# Patient Record
Sex: MALE | Race: WHITE | NOT HISPANIC OR LATINO | Employment: FULL TIME | URBAN - METROPOLITAN AREA
[De-identification: names, ages, dates, MRNs, and addresses within clinical notes are randomized per-mention and may not be internally consistent; named-entity substitution may affect disease eponyms.]

---

## 2017-03-21 ENCOUNTER — TELEPHONE (OUTPATIENT)
Dept: FAMILY MEDICINE | Facility: OTHER | Age: 47
End: 2017-03-21

## 2017-03-21 NOTE — TELEPHONE ENCOUNTER
Reason for call:  Medication  Reason for Call:  Medication or medication refill:    Do you use a [facility]?  Name of the pharmacy and phone number for the current request:  [facility] - [phone]    Name of the medication requested: rx for flying    Other request: is wondering if he could get 6 of them.    Can we leave a detailed message on this number? YES    Phone number patient can be reached at: [phone]    Best Time: any    Call taken on 3/21/2017 at 10:22 AM by Gi Landeros

## 2017-03-21 NOTE — PROGRESS NOTES
SUBJECTIVE:                                                    Pelon Flores is a 46 year old male who presents to clinic today for the following health issues:  Please review meds.  Patient doesn't take the atarax.    Rx for flying.    Pt has flying anxiety.  Diazepam works well for this.  He used to fly twice/week and got used to it, but now flies a lot less, has some issues with this.      Hasn't had a problem with this.    Now flying about 12 times/year.      Problem list and histories reviewed & adjusted, as indicated.  Additional history: as documented    Current Outpatient Prescriptions   Medication Sig Dispense Refill     diazepam (VALIUM) 10 MG tablet Take 1 tablet by mouth daily  Prior to flight 6 tablet 0     Pseudoephedrine-Ibuprofen (ADVIL COLD/SINUS PO)        hydrOXYzine (ATARAX) 25 MG tablet Take 1-2 tablets (25-50 mg) by mouth every 6 hours as needed for anxiety (Patient not taking: Reported on 3/23/2017) 60 tablet 0     omeprazole 20 MG tablet Take 1 tablet by mouth daily. Take 30-60 minutes before a meal. (Patient not taking: Reported on 3/23/2017) 90 tablet 1     Recent Labs   Lab Test  07/23/12   0933  01/28/10   0744  02/01/09   1410   LDL  114  123   --    HDL  34*  38*   --    TRIG  69  131   --    ALT   --    --   23   CR   --    --   0.94   GFRESTIMATED   --    --   90   GFRESTBLACK   --    --   >90   POTASSIUM   --    --   3.4      BP Readings from Last 3 Encounters:   03/23/17 134/88   03/23/15 (!) 132/97   03/27/14 122/68    Wt Readings from Last 3 Encounters:   03/23/17 171 lb 9.6 oz (77.8 kg)   03/27/14 171 lb (77.6 kg)   02/21/13 163 lb (73.9 kg)                    Reviewed and updated as needed this visit by clinical staff  Tobacco  Allergies  Med Hx  Surg Hx  Fam Hx  Soc Hx      Reviewed and updated as needed this visit by Provider         ROS:  Constitutional, HEENT, cardiovascular, pulmonary, gi and gu systems are negative, except as otherwise noted.    OBJECTIVE:        "                                             /88 (BP Location: Left arm, Patient Position: Chair, Cuff Size: Adult Large)  Pulse 84  Temp 97.9  F (36.6  C) (Temporal)  Resp 16  Ht 5' 10\" (1.778 m)  Wt 171 lb 9.6 oz (77.8 kg)  BMI 24.62 kg/m2  Body mass index is 24.62 kg/(m^2).  GENERAL: healthy, alert and no distress  NECK: no adenopathy, no asymmetry, masses, or scars and thyroid normal to palpation  RESP: lungs clear to auscultation - no rales, rhonchi or wheezes  CV: regular rate and rhythm, normal S1 S2, no S3 or S4, no murmur, click or rub, no peripheral edema and peripheral pulses strong  ABDOMEN: soft, nontender, no hepatosplenomegaly, no masses and bowel sounds normal  MS: no gross musculoskeletal defects noted, no edema    Diagnostic Test Results:  none     ASSESSMENT/PLAN:                                                        ICD-10-CM    1. Situational anxiety F41.8 diazepam (VALIUM) 10 MG tablet     Pt has fear of flying, but has done well for years with occasional diazepam use.  Discussed precautions he should take while using this medication.  His use has not been excessive.  No concerns on today's evaluation of patient.  Continue current regimen.  Call/return if any problems or questions arise.               Patient Instructions   Thank you for visiting [facility]    Have a good trip!    I would recommend a physical with labs in the next several months to ensure your blood sugars and cholesterol are OK.    Please make an appointment with your either myself or your provider  in 1 year for follow up.       If you had imaging scheduled please refer to your radiology prep sheet.    Appointment    Date_______________     Time_____________    Day:   M TU W TH F    With____________________________    Location_________________________    If you need medication refills, please contact your pharmacy 3 days before your prescriptions runs out. If you are out of refills, your " pharmacy will contact contact the clinic.    Contact us or return if questions or concerns.     -Your Care Team:  MD Mirela Burgess PA-C Folake Falaki, MD Anoshirvan Mazhari, MD Kelly White, CNP    General information about your clinic      Clinic hours:     Lab hours:  Phone [phone]  Monday 7:30 am-7 pm    Monday 8:30 am-6:30 pm  Tuesday-Friday 7:30 am-5 pm   Tuesday-Friday 8:30 am-4:30 pm    Pharmacy hours:  Phone [phone]  Monday 8:30 am-7pm  Tuesday-Friday 8:30am-6 pm                                       Mychart assistance [phone]        We would like to hear from you, how was your visit today?    Sadie Arriaga  Patient Information Supervisor   Patient Care Supervisor  [facility], and [facility], [facility]  [phone]         Ney Bernstein MD, MD  [facility]

## 2017-03-23 ENCOUNTER — OFFICE VISIT (OUTPATIENT)
Dept: FAMILY MEDICINE | Facility: OTHER | Age: 47
End: 2017-03-23
Payer: COMMERCIAL

## 2017-03-23 VITALS
RESPIRATION RATE: 16 BRPM | HEART RATE: 84 BPM | WEIGHT: 171.6 LBS | SYSTOLIC BLOOD PRESSURE: 134 MMHG | TEMPERATURE: 97.9 F | BODY MASS INDEX: 24.57 KG/M2 | HEIGHT: 70 IN | DIASTOLIC BLOOD PRESSURE: 88 MMHG

## 2017-03-23 DIAGNOSIS — F41.8 SITUATIONAL ANXIETY: ICD-10-CM

## 2017-03-23 PROCEDURE — 99213 OFFICE O/P EST LOW 20 MIN: CPT | Performed by: FAMILY MEDICINE

## 2017-03-23 RX ORDER — DIAZEPAM 10 MG
TABLET ORAL
Qty: 12 TABLET | Refills: 0 | Status: SHIPPED | OUTPATIENT
Start: 2017-03-23 | End: 2018-03-20

## 2017-03-23 ASSESSMENT — PAIN SCALES - GENERAL: PAINLEVEL: NO PAIN (0)

## 2017-03-23 NOTE — PATIENT INSTRUCTIONS
Thank you for visiting [facility]    Have a good trip!    I would recommend a physical with labs in the next several months to ensure your blood sugars and cholesterol are OK.    Please make an appointment with your either myself or your provider  in 1 year for follow up.       If you had imaging scheduled please refer to your radiology prep sheet.    Appointment    Date_______________     Time_____________    Day:   M TU W TH F    With____________________________    Location_________________________    If you need medication refills, please contact your pharmacy 3 days before your prescriptions runs out. If you are out of refills, your pharmacy will contact contact the clinic.    Contact us or return if questions or concerns.     -Your Care Team:  MD Mirela Burgess PA-C Folake Falaki, MD Anoshirvan Mazhari, MD Kelly White, SHAMA    General information about your clinic      Clinic hours:     Lab hours:  Phone [phone]  Monday 7:30 am-7 pm    Monday 8:30 am-6:30 pm  Tuesday-Friday 7:30 am-5 pm   Tuesday-Friday 8:30 am-4:30 pm    Pharmacy hours:  Phone [phone]  Monday 8:30 am-7pm  Tuesday-Friday 8:30am-6 pm                                       Mychart assistance [phone]        We would like to hear from you, how was your visit today?    Sadie Arriaga  Patient Information Supervisor   Patient Care Supervisor  [facility], and [facility], and [facility]  [phone]

## 2017-03-23 NOTE — MR AVS SNAPSHOT
After Visit Summary   3/23/2017    Pelon Flores    MRN: [id number]           Patient Information     Date Of Birth          1970        Visit Information        Provider Department      3/23/2017 9:45 AM Ney Bernstein MD [facility]        Today's Diagnoses     Anxiety          Care Instructions    Thank you for visiting [facility]    Have a good trip!    I would recommend a physical with labs in the next several months to ensure your blood sugars and cholesterol are OK.    Please make an appointment with your either myself or your provider  in 1 year for follow up.       If you had imaging scheduled please refer to your radiology prep sheet.    Appointment    Date_______________     Time_____________    Day:   M TU W TH F    With____________________________    Location_________________________    If you need medication refills, please contact your pharmacy 3 days before your prescriptions runs out. If you are out of refills, your pharmacy will contact contact the clinic.    Contact us or return if questions or concerns.     -Your Care Team:  MD Mirela Burgess PA-C Folake Falaki, MD Anoshirvan Mazhari, MD Kelly White, SHAMA    General information about your clinic      Clinic hours:     Lab hours:  Phone [phone]  Monday 7:30 am-7 pm    Monday 8:30 am-6:30 pm  Tuesday-Friday 7:30 am-5 pm   Tuesday-Friday 8:30 am-4:30 pm    Pharmacy hours:  Phone [phone]  Monday 8:30 am-7pm  Tuesday-Friday 8:30am-6 pm                                       Mychart assistance [phone]        We would like to hear from you, how was your visit today?    Sadie Arriaga  Patient Information Supervisor   Patient Care Supervisor  [facility], and [facility], and [facility]  [phone]           Follow-ups after your visit        Who to contact     If you  "have questions or need follow up information about today's clinic visit or your schedule please contact [facility] directly at [phone].  Normal or non-critical lab and imaging results will be communicated to you by MyChart, letter or phone within 4 business days after the clinic has received the results. If you do not hear from us within 7 days, please contact the clinic through [employer] or phone. If you have a critical or abnormal lab result, we will notify you by phone as soon as possible.  Submit refill requests through [employer] or call your pharmacy and they will forward the refill request to us. Please allow 3 business days for your refill to be completed.          Additional Information About Your Visit        [employer] Information     [employer] lets you send messages to your doctor, view your test results, renew your prescriptions, schedule appointments and more. To sign up, go to [email or web address] . Click on \"Log in\" on the left side of the screen, which will take you to the Welcome page. Then click on \"Sign up Now\" on the right side of the page.     You will be asked to enter the access code listed below, as well as some personal information. Please follow the directions to create your username and password.     Your access code is: [email or web address]  Expires: 2017  9:57 AM     Your access code will  in 90 days. If you need help or a new code, please call your [place] clinic or [phone].        Care EveryWhere ID     This is your Care EveryWhere ID. This could be used by other organizations to access your [facility] medical records  [id number]        Your Vitals Were     Pulse Temperature Respirations Height BMI (Body Mass Index)       84 97.9  F (36.6  C) (Temporal) 16 5' 10\" (1.778 m) 24.62 kg/m2        Blood Pressure from Last 3 Encounters:   17 134/88   03/23/15 (!) 132/97   14 122/68    Weight from Last 3 Encounters:   17 171 lb 9.6 oz (77.8 kg) "   03/27/14 171 lb (77.6 kg)   02/21/13 163 lb (73.9 kg)              Today, you had the following     No orders found for display         Where to get your medicines      Some of these will need a paper prescription and others can be bought over the counter.  Ask your nurse if you have questions.     Bring a paper prescription for each of these medications     diazepam 10 MG tablet          Primary Care Provider Office Phone # Fax #    Linda Aruna Medley [phone]       [facility] [address] [place] [address]        Thank you!     Thank you for choosing [facility]  for your care. Our goal is always to provide you with excellent care. Hearing back from our patients is one way we can continue to improve our services. Please take a few minutes to complete the written survey that you may receive in the mail after your visit with us. Thank you!             Your Updated Medication List - Protect others around you: Learn how to safely use, store and throw away your medicines at www.disposemymeds.org.          This list is accurate as of: 3/23/17  9:57 AM.  Always use your most recent med list.                   Brand Name Dispense Instructions for use    ADVIL COLD/SINUS PO          diazepam 10 MG tablet    VALIUM    12 tablet    Take 1 tablet by mouth daily  Prior to flight       omeprazole 20 MG tablet     90 tablet    Take 1 tablet by mouth daily. Take 30-60 minutes before a meal.

## 2017-03-23 NOTE — NURSING NOTE
"Chief Complaint   Patient presents with     Flying     Panel Management     mychart       Initial /88 (BP Location: Left arm, Patient Position: Chair, Cuff Size: Adult Large)  Pulse 84  Temp 97.9  F (36.6  C) (Temporal)  Resp 16  Ht 5' 10\" (1.778 m)  Wt 171 lb 9.6 oz (77.8 kg)  BMI 24.62 kg/m2 Estimated body mass index is 24.62 kg/(m^2) as calculated from the following:    Height as of this encounter: 5' 10\" (1.778 m).    Weight as of this encounter: 171 lb 9.6 oz (77.8 kg).  Medication Reconciliation: complete  Dagoberto Pimentel CMA    "

## 2017-09-29 ENCOUNTER — OFFICE VISIT (OUTPATIENT)
Dept: URGENT CARE | Facility: RETAIL CLINIC | Age: 47
End: 2017-09-29
Payer: COMMERCIAL

## 2017-09-29 VITALS
HEART RATE: 80 BPM | OXYGEN SATURATION: 99 % | TEMPERATURE: 97.6 F | SYSTOLIC BLOOD PRESSURE: 136 MMHG | DIASTOLIC BLOOD PRESSURE: 94 MMHG

## 2017-09-29 DIAGNOSIS — R06.2 WHEEZING: ICD-10-CM

## 2017-09-29 DIAGNOSIS — J20.9 ACUTE BRONCHITIS WITH COEXISTING CONDITION REQUIRING PROPHYLACTIC TREATMENT: Primary | ICD-10-CM

## 2017-09-29 PROCEDURE — 99213 OFFICE O/P EST LOW 20 MIN: CPT | Performed by: PHYSICIAN ASSISTANT

## 2017-09-29 RX ORDER — ALBUTEROL SULFATE 90 UG/1
1-2 AEROSOL, METERED RESPIRATORY (INHALATION) EVERY 4 HOURS PRN
Qty: 1 INHALER | Refills: 0 | Status: SHIPPED | OUTPATIENT
Start: 2017-09-29 | End: 2018-03-23

## 2017-09-29 RX ORDER — CODEINE PHOSPHATE AND GUAIFENESIN 10; 100 MG/5ML; MG/5ML
1-2 SOLUTION ORAL
Qty: 120 ML | Refills: 0 | Status: SHIPPED | OUTPATIENT
Start: 2017-09-29 | End: 2018-03-23

## 2017-09-29 RX ORDER — AZITHROMYCIN 250 MG/1
TABLET, FILM COATED ORAL
Qty: 6 TABLET | Refills: 0 | Status: SHIPPED | OUTPATIENT
Start: 2017-09-29 | End: 2017-10-03

## 2017-09-29 NOTE — MR AVS SNAPSHOT
"              After Visit Summary   2017    Pelon Flores    MRN: [id number]           Patient Information     Date Of Birth          1970        Visit Information        Provider Department      2017 10:10 AM Alexandra Loera PA-C [facility] [place]        Today's Diagnoses     Acute bronchitis with coexisting condition requiring prophylactic treatment    -  1      Care Instructions    Take antibiotic as directed  Use inhaler as needed  Codeine cough suppressant at bedtime as needed  Can take any cough suppressant during daytime  Can take advil cold and sinus  Fluids, rest, cough drops, humidifier, over the counter pain relievers as needed  Please follow up with primary care provider if not improving, worsening or new symptoms or for any adverse reactions to medications.   May need chest xray if persistent symptoms.           Follow-ups after your visit        Who to contact     You can reach your care team any time of the day by calling [phone].  Notification of test results:  If you have an abnormal lab result, we will notify you by phone as soon as possible.         Additional Information About Your Visit        MyChart Information     [employer] lets you send messages to your doctor, view your test results, renew your prescriptions, schedule appointments and more. To sign up, go to [email or web address] . Click on \"Log in\" on the left side of the screen, which will take you to the Welcome page. Then click on \"Sign up Now\" on the right side of the page.     You will be asked to enter the access code listed below, as well as some personal information. Please follow the directions to create your username and password.     Your access code is: [email or web address]  Expires: 2017 11:05 AM     Your access code will  in 90 days. If you need help or a new code, please call your [place] clinic or [phone].        Care EveryWhere ID     This is your Care EveryWhere ID. " This could be used by other organizations to access your [place] medical records  [id number]        Your Vitals Were     Pulse Temperature Pulse Oximetry             80 97.6  F (36.4  C) (Temporal) 99%          Blood Pressure from Last 3 Encounters:   09/29/17 (!) 136/94   03/23/17 134/88   03/23/15 (!) 132/97    Weight from Last 3 Encounters:   03/23/17 171 lb 9.6 oz (77.8 kg)   03/27/14 171 lb (77.6 kg)   02/21/13 163 lb (73.9 kg)              Today, you had the following     No orders found for display         Today's Medication Changes          These changes are accurate as of: 9/29/17 11:08 AM.  If you have any questions, ask your nurse or doctor.               Start taking these medicines.        Dose/Directions    albuterol 108 (90 BASE) MCG/ACT Inhaler   Commonly known as:  PROAIR HFA/PROVENTIL HFA/VENTOLIN HFA   Used for:  Acute bronchitis with coexisting condition requiring prophylactic treatment        Dose:  1-2 puff   Inhale 1-2 puffs into the lungs every 4 hours as needed For wheezing   Quantity:  1 Inhaler   Refills:  0       azithromycin 250 MG tablet   Commonly known as:  ZITHROMAX   Used for:  Acute bronchitis with coexisting condition requiring prophylactic treatment        Two tablets first day, then one tablet daily for four days.   Quantity:  6 tablet   Refills:  0       guaiFENesin-codeine 100-10 MG/5ML Soln solution   Commonly known as:  ROBITUSSIN AC   Used for:  Acute bronchitis with coexisting condition requiring prophylactic treatment        Dose:  1-2 tsp.   Take 5-10 mLs by mouth nightly as needed for cough   Quantity:  120 mL   Refills:  0            Where to get your medicines      These medications were sent to [facility] - [address]  [address] [place] [address]     Phone:  [phone]     albuterol 108 (90 BASE) MCG/ACT Inhaler    azithromycin 250 MG tablet         Some of these will need a paper prescription and others can  be bought over the counter.  Ask your nurse if you have questions.     Bring a paper prescription for each of these medications     guaiFENesin-codeine 100-10 MG/5ML Soln solution                Primary Care Provider Office Phone # Fax #    Linda Aruna Medley [phone]       XXX RESIGNED XXX [address] [place] [address]        Equal Access to Services     [facility]: Hadii aad ku hadasho Soomaali, waaxda luqadaha, qaybta kaalmada adeegyada, waxay idiin hayaan adeeg kharash la'aan ah. So [facility] [phone].    ATENCIÓN: Si habla espcori, tiene a leahy disposición servicios gratuitos de asistencia lingüística. [facility] [phone].    We comply with applicable federal civil rights laws and [place] laws. We do not discriminate on the basis of race, color, national origin, age, disability sex, sexual orientation or gender identity.            Thank you!     Thank you for choosing [facility]  for your care. Our goal is always to provide you with excellent care. Hearing back from our patients is one way we can continue to improve our services. Please take a few minutes to complete the written survey that you may receive in the mail after your visit with us. Thank you!             Your Updated Medication List - Protect others around you: Learn how to safely use, store and throw away your medicines at www.disposemymeds.org.          This list is accurate as of: 9/29/17 11:08 AM.  Always use your most recent med list.                   Brand Name Dispense Instructions for use Diagnosis    ADVIL COLD/SINUS PO           albuterol 108 (90 BASE) MCG/ACT Inhaler    PROAIR HFA/PROVENTIL HFA/VENTOLIN HFA    1 Inhaler    Inhale 1-2 puffs into the lungs every 4 hours as needed For wheezing    Acute bronchitis with coexisting condition requiring prophylactic treatment       azithromycin 250 MG tablet    ZITHROMAX    6 tablet    Two tablets first day, then one tablet daily for four  days.    Acute bronchitis with coexisting condition requiring prophylactic treatment       diazepam 10 MG tablet    VALIUM    12 tablet    Take 1 tablet by mouth daily  Prior to flight    Situational anxiety       guaiFENesin-codeine 100-10 MG/5ML Soln solution    ROBITUSSIN AC    120 mL    Take 5-10 mLs by mouth nightly as needed for cough    Acute bronchitis with coexisting condition requiring prophylactic treatment       omeprazole 20 MG tablet     90 tablet    Take 1 tablet by mouth daily. Take 30-60 minutes before a meal.    GERD (gastroesophageal reflux disease)       ROBITUSSIN CF PO

## 2017-09-29 NOTE — PATIENT INSTRUCTIONS
Take antibiotic as directed  Use inhaler as needed  Codeine cough suppressant at bedtime as needed  Can take any cough suppressant during daytime  Can take advil cold and sinus  Fluids, rest, cough drops, humidifier, over the counter pain relievers as needed  Please follow up with primary care provider if not improving, worsening or new symptoms or for any adverse reactions to medications.   May need chest xray if persistent symptoms.

## 2017-09-29 NOTE — NURSING NOTE
"Chief Complaint   Patient presents with     Cough     1 week; with wheezing       Initial BP (!) 136/94 (BP Location: Left arm)  Pulse 80  Temp 97.6  F (36.4  C) (Temporal)  SpO2 99% Estimated body mass index is 24.62 kg/(m^2) as calculated from the following:    Height as of 3/23/17: 5' 10\" (1.778 m).    Weight as of 3/23/17: 171 lb 9.6 oz (77.8 kg).  Medication Reconciliation: complete  "

## 2017-09-29 NOTE — PROGRESS NOTES
Chief Complaint   Patient presents with     Cough     1 week; with wheezing     SUBJECTIVE:  Pelon Flores is a 47 year old male who presents to the clinic today with a chief complaint of cough  for 1 week(s).  His cough is described as persistent.    The patient's symptoms are moderate and worsening.  Associated symptoms include congestion. The patient's symptoms are exacerbated by lying down  Patient has been using advil cold and sinus, robitussin to improve symptoms.    Past Medical History:   Diagnosis Date     DEPRESSIVE PSYCHOSIS-MOD 12/2/2007     Inguinal hernia without mention of obstruction or gangrene, bilateral, (not specified as recurrent)      ISOLATED OR SPECIFIC PHOBIAS NEC 2/6/2007     Palpitations 1982    suspected recurring SVT     Current Outpatient Prescriptions   Medication Sig Dispense Refill     Pseudoephedrine-DM-GG (ROBITUSSIN CF PO)        azithromycin (ZITHROMAX) 250 MG tablet Two tablets first day, then one tablet daily for four days. 6 tablet 0     albuterol (PROAIR HFA/PROVENTIL HFA/VENTOLIN HFA) 108 (90 BASE) MCG/ACT Inhaler Inhale 1-2 puffs into the lungs every 4 hours as needed For wheezing 1 Inhaler 0     guaiFENesin-codeine (ROBITUSSIN AC) 100-10 MG/5ML SOLN solution Take 5-10 mLs by mouth nightly as needed for cough 120 mL 0     Pseudoephedrine-Ibuprofen (ADVIL COLD/SINUS PO)        diazepam (VALIUM) 10 MG tablet Take 1 tablet by mouth daily  Prior to flight (Patient not taking: Reported on 9/29/2017) 12 tablet 0     omeprazole 20 MG tablet Take 1 tablet by mouth daily. Take 30-60 minutes before a meal. (Patient not taking: Reported on 3/23/2017) 90 tablet 1        Allergies   Allergen Reactions     Nkda [No Known Drug Allergies]         History   Smoking Status     Never Smoker   Smokeless Tobacco     Never Used     Comment: no smokers in the household       ROS  CONSTITUTIONAL:NEGATIVE for fever, chills  ENT/MOUTH: POSITIVE for nasal congestion and NEGATIVE for sore  throat  RESP:POSITIVE for cough and wheezing    OBJECTIVE:  BP (!) 136/94 (BP Location: Left arm)  Pulse 80  Temp 97.6  F (36.4  C) (Temporal)  SpO2 99%  GENERAL APPEARANCE: healthy, alert and no distress  EYES: conjunctiva clear  HENT: ear canals and TM's normal.  Nose congested mouth without ulcers, erythema or lesions  NECK: supple, nontender, no lymphadenopathy  RESP: rhonchi and few wheezes in upper lobes, no rales  CV: regular rates and rhythm, normal S1 S2, no murmur noted  SKIN: no suspicious lesions or rashes    ASSESSMENT:    (J20.9) Acute bronchitis with coexisting condition requiring prophylactic treatment   (R06.2) Wheezing    PLAN:  azithromycin (ZITHROMAX) 250 MG tablet  albuterol (PROAIR HFA/PROVENTIL HFA/VENTOLIN HFA) 108 (90 BASE) MCG/ACT Inhaler  guaiFENesin-codeine (ROBITUSSIN AC) 100-10 MG/5ML SOLN solution  Take antibiotic as directed  Use inhaler as needed  Codeine cough suppressant at bedtime as needed  Can take any cough suppressant during daytime  Can take advil cold and sinus  Fluids, rest, cough drops, humidifier, over the counter pain relievers as needed  Please follow up with primary care provider if not improving, worsening or new symptoms or for any adverse reactions to medications.   May need chest xray if persistent symptoms.     Alexandra Loera PA-C  [facility]

## 2018-03-05 ENCOUNTER — ALLIED HEALTH/NURSE VISIT (OUTPATIENT)
Dept: FAMILY MEDICINE | Facility: OTHER | Age: 48
End: 2018-03-05
Payer: COMMERCIAL

## 2018-03-05 ENCOUNTER — TELEPHONE (OUTPATIENT)
Dept: FAMILY MEDICINE | Facility: OTHER | Age: 48
End: 2018-03-05

## 2018-03-05 DIAGNOSIS — Z20.818 PERTUSSIS EXPOSURE: Primary | ICD-10-CM

## 2018-03-05 DIAGNOSIS — Z23 NEED FOR VACCINATION: Primary | ICD-10-CM

## 2018-03-05 PROCEDURE — 99207 ZZC NO CHARGE LOS: CPT

## 2018-03-05 PROCEDURE — 90471 IMMUNIZATION ADMIN: CPT

## 2018-03-05 PROCEDURE — 90715 TDAP VACCINE 7 YRS/> IM: CPT

## 2018-03-05 RX ORDER — AZITHROMYCIN 250 MG/1
TABLET, FILM COATED ORAL
Qty: 6 TABLET | Refills: 0 | Status: SHIPPED | OUTPATIENT
Start: 2018-03-05 | End: 2018-03-23

## 2018-03-05 NOTE — TELEPHONE ENCOUNTER
Child positive for pertussis.  Per provider ok to treat.    Wife informed.  Last tdap 01/28/2018  Recommend Tdap early per provider    Manuel Lewis, RN, BSN

## 2018-03-05 NOTE — PROGRESS NOTES
Screening Questionnaire for Adult Immunization    Are you sick today?   No   Do you have allergies to medications, food, a vaccine component or latex?   No   Have you ever had a serious reaction after receiving a vaccination?   No   Do you have a long-term health problem with heart disease, lung disease, asthma, kidney disease, metabolic disease (e.g. diabetes), anemia, or other blood disorder?   No   Do you have cancer, leukemia, HIV/AIDS, or any other immune system problem?   No   In the past 3 months, have you taken medications that affect  your immune system, such as prednisone, other steroids, or anticancer drugs; drugs for the treatment of rheumatoid arthritis, Crohn s disease, or psoriasis; or have you had radiation treatments?   No   Have you had a seizure, or a brain or other nervous system problem?   No   During the past year, have you received a transfusion of blood or blood     products, or been given immune (gamma) globulin or antiviral drug?   No   For women: Are you pregnant or is there a chance you could become        pregnant during the next month?   No   Have you received any vaccinations in the past 4 weeks?   No     Immunization questionnaire answers were all negative.        Per orders of Maximus Manzano, injection of TDAP given by Myrtle Royal. Patient instructed to remain in clinic for 15 minutes afterwards, and to report any adverse reaction to me immediately.       Screening performed by Myrtle Royal on 3/5/2018 at 12:30 PM.

## 2018-03-05 NOTE — MR AVS SNAPSHOT
"              After Visit Summary   3/5/2018    Pelon Flores    MRN: [id number]           Patient Information     Date Of Birth          1970        Visit Information        Provider Department      3/5/2018 1:30 PM NL FLOAT NURSE [facility]        Today's Diagnoses     Need for vaccination    -  1       Follow-ups after your visit        Your next 10 appointments already scheduled     Mar 05, 2018  1:30 PM CST   Nurse Only with NL FLOAT NURSE [facility] ([facility])    [address] [address] [phone]              Who to contact     If you have questions or need follow up information about today's clinic visit or your schedule please contact [facility] directly at [phone].  Normal or non-critical lab and imaging results will be communicated to you by MyChart, letter or phone within 4 business days after the clinic has received the results. If you do not hear from us within 7 days, please contact the clinic through MyChart or phone. If you have a critical or abnormal lab result, we will notify you by phone as soon as possible.  Submit refill requests through QuickBl[employer] or call your pharmacy and they will forward the refill request to us. Please allow 3 business days for your refill to be completed.          Additional Information About Your Visit        MyChart Information     QuickBlox lets you send messages to your doctor, view your test results, renew your prescriptions, schedule appointments and more. To sign up, go to [email or web address] . Click on \"Log in\" on the left side of the screen, which will take you to the Welcome page. Then click on \"Sign up Now\" on the right side of the page.     You will be asked to enter the access code listed below, as well as some personal information. Please follow the directions to create your username and password.     Your access code is: [email or web address]  Expires: " 6/3/2018 12:31 PM     Your access code will  in 90 days. If you need help or a new code, please call your [place] clinic or [phone].        Care EveryWhere ID     This is your Care EveryWhere ID. This could be used by other organizations to access your [place] medical records  [id number]         Blood Pressure from Last 3 Encounters:   17 (!) 136/94   17 134/88   03/23/15 (!) 132/97    Weight from Last 3 Encounters:   17 171 lb 9.6 oz (77.8 kg)   14 171 lb (77.6 kg)   13 163 lb (73.9 kg)              We Performed the Following     TDAP VACCINE (ADACEL) [17156.002]          Today's Medication Changes          These changes are accurate as of 3/5/18 12:31 PM.  If you have any questions, ask your nurse or doctor.               Start taking these medicines.        Dose/Directions    azithromycin 250 MG tablet   Commonly known as:  ZITHROMAX   Used for:  Pertussis exposure   Started by:  Maximus Paulson PA-C        Two tablets first day, then one tablet daily for four days.   Quantity:  6 tablet   Refills:  0            Where to get your medicines      These medications were sent to [facility] - [id number]   [address] [place] [address]     Phone:  [phone]     azithromycin 250 MG tablet                Primary Care Provider Fax #    Physician No Ref-Primary [phone]       No address on file        Equal Access to Services     REED [facility] CenterINOCENCIA : Hadii alex olivia hadasho Sojarvisali, waaxda luqadaha, qaybta kaalmada adeegyada, waxay vilma hodgson. So [facility] [phone].    ATENCIÓN: Si habla español, tiene a leahy disposición servicios gratuitos de asistencia lingüística. Llame al [phone].    We comply with applicable federal civil rights laws and [place] laws. We do not discriminate on the basis of race, color, national origin, age, disability, sex, sexual orientation, or gender identity.            Thank  you!     Thank you for choosing [facility]  for your care. Our goal is always to provide you with excellent care. Hearing back from our patients is one way we can continue to improve our services. Please take a few minutes to complete the written survey that you may receive in the mail after your visit with us. Thank you!             Your Updated Medication List - Protect others around you: Learn how to safely use, store and throw away your medicines at www.disposemymeds.org.          This list is accurate as of 3/5/18 12:31 PM.  Always use your most recent med list.                   Brand Name Dispense Instructions for use Diagnosis    ADVIL COLD/SINUS PO           albuterol 108 (90 BASE) MCG/ACT Inhaler    PROAIR HFA/PROVENTIL HFA/VENTOLIN HFA    1 Inhaler    Inhale 1-2 puffs into the lungs every 4 hours as needed For wheezing    Acute bronchitis with coexisting condition requiring prophylactic treatment       azithromycin 250 MG tablet    ZITHROMAX    6 tablet    Two tablets first day, then one tablet daily for four days.    Pertussis exposure       diazepam 10 MG tablet    VALIUM    12 tablet    Take 1 tablet by mouth daily  Prior to flight    Situational anxiety       guaiFENesin-codeine 100-10 MG/5ML Soln solution    ROBITUSSIN AC    120 mL    Take 5-10 mLs by mouth nightly as needed for cough    Acute bronchitis with coexisting condition requiring prophylactic treatment       omeprazole 20 MG tablet     90 tablet    Take 1 tablet by mouth daily. Take 30-60 minutes before a meal.    GERD (gastroesophageal reflux disease)       ROBITUSSIN CF PO

## 2018-03-05 NOTE — NURSING NOTE
Prior to injection verified patient identity using patient's name and date of birth.    Myrtle Royal MA

## 2018-03-16 ENCOUNTER — TELEPHONE (OUTPATIENT)
Dept: FAMILY MEDICINE | Facility: OTHER | Age: 48
End: 2018-03-16

## 2018-03-16 NOTE — TELEPHONE ENCOUNTER
Reason for Call:  Medication or medication refill:    Do you use a [facility]?  Name of the pharmacy and phone number for the current request:  [facility] - [phone]    Name of the medication requested: diazepam    Other request: patient will be flying    Can we leave a detailed message on this number? NO    Phone number patient can be reached at: Cell number on file:    Telephone Information:   Mobile [phone]       Best Time: any    Call taken on 3/16/2018 at 8:25 AM by Doris Dias

## 2018-03-20 DIAGNOSIS — F41.8 SITUATIONAL ANXIETY: ICD-10-CM

## 2018-03-20 NOTE — TELEPHONE ENCOUNTER
diazepam (VALIUM) 10 MG tablet      Last Written Prescription Date:  03/23/2017  Last Fill Quantity: 12,   # refills: 0  Last Office Visit: 03/23/2017  Future Office visit:       Routing refill request to provider for review/approval because:  Drug not on the [facility], [facility] or [facility] refill protocol or controlled substance  Due for physical 03/23/2018 or after    Please assist with scheduling.    Karla Sequeira RN, BSN

## 2018-03-21 RX ORDER — DIAZEPAM 10 MG
TABLET ORAL
Qty: 2 TABLET | Refills: 0 | Status: SHIPPED | OUTPATIENT
Start: 2018-03-21 | End: 2018-03-23

## 2018-03-21 NOTE — TELEPHONE ENCOUNTER
Spoke with patient informed him of message below, Rx has been walked over to [facility] pharmacy  Closing encounter  Tarsha Ko RT (R)

## 2018-03-23 ENCOUNTER — OFFICE VISIT (OUTPATIENT)
Dept: FAMILY MEDICINE | Facility: OTHER | Age: 48
End: 2018-03-23
Payer: COMMERCIAL

## 2018-03-23 VITALS
DIASTOLIC BLOOD PRESSURE: 88 MMHG | HEART RATE: 76 BPM | WEIGHT: 176 LBS | OXYGEN SATURATION: 100 % | SYSTOLIC BLOOD PRESSURE: 138 MMHG | BODY MASS INDEX: 25.2 KG/M2 | HEIGHT: 70 IN | RESPIRATION RATE: 16 BRPM | TEMPERATURE: 98 F

## 2018-03-23 DIAGNOSIS — F41.8 SITUATIONAL ANXIETY: ICD-10-CM

## 2018-03-23 DIAGNOSIS — Z13.1 SCREENING FOR DIABETES MELLITUS: ICD-10-CM

## 2018-03-23 DIAGNOSIS — Z13.220 SCREENING FOR LIPOID DISORDERS: Primary | ICD-10-CM

## 2018-03-23 DIAGNOSIS — F40.243 PHOBIA, FLYING: ICD-10-CM

## 2018-03-23 PROCEDURE — 99213 OFFICE O/P EST LOW 20 MIN: CPT | Performed by: FAMILY MEDICINE

## 2018-03-23 RX ORDER — DIAZEPAM 10 MG
TABLET ORAL
Qty: 10 TABLET | Refills: 0 | Status: SHIPPED | OUTPATIENT
Start: 2018-03-23 | End: 2019-02-25

## 2018-03-23 ASSESSMENT — PAIN SCALES - GENERAL: PAINLEVEL: NO PAIN (0)

## 2018-03-23 NOTE — ASSESSMENT & PLAN NOTE
Has been taking valium for flying for greater than 10 yrs. Needs 10 pills a yr. No concerns for abuse  Refills provided

## 2018-03-23 NOTE — PROGRESS NOTES
SUBJECTIVE:   Pelon Flores is a 47 year old male who presents to clinic today for the following health issues:      HPI  Medication Followup of Valium    Taking Medication as prescribed: yes    Side Effects:  None    Medication Helping Symptoms:  yes     Problem list and histories reviewed & adjusted, as indicated.  Additional history: as documented        Patient Active Problem List   Diagnosis     Dyspepsia and other specified disorders of function of stomach     Irritable bowel syndrome with diarrhea     Family history of diabetes mellitus     ISOLATED OR SPECIFIC PHOBIAS NEC     GERD (gastroesophageal reflux disease)     Chronic maxillary sinusitis     Phobia, flying     Past Surgical History:   Procedure Laterality Date     HERNIA REPAIR, INGUINAL RT/LT         Social History   Substance Use Topics     Smoking status: Never Smoker     Smokeless tobacco: Never Used      Comment: no smokers in the household     Alcohol use Yes      Comment: Rare     Family History   Problem Relation Age of Onset     Depression Mother      Lipids Mother      on med     Depression Brother      on med for treatment     DIABETES Father      type 2 age 50     Lipids Father      on med     GASTROINTESTINAL DISEASE No family hx of      HEART DISEASE No family hx of      no arrhythmias     C.A.D. No family hx of      Cancer - colorectal No family hx of      Prostate Cancer No family hx of          Current Outpatient Prescriptions   Medication Sig Dispense Refill     diazepam (VALIUM) 10 MG tablet Take 1 tablet by mouth daily  Prior to flight 10 tablet 0     Pseudoephedrine-DM-GG (ROBITUSSIN CF PO)        Pseudoephedrine-Ibuprofen (ADVIL COLD/SINUS PO)        Allergies   Allergen Reactions     Nkda [No Known Drug Allergies]      BP Readings from Last 3 Encounters:   03/23/18 138/88   09/29/17 (!) 136/94   03/23/17 134/88    Wt Readings from Last 3 Encounters:   03/23/18 176 lb (79.8 kg)   03/23/17 171 lb 9.6 oz (77.8 kg)   03/27/14  "171 lb (77.6 kg)                  Labs reviewed in EPIC    ROS:  Constitutional, HEENT, cardiovascular, pulmonary, gi and gu systems are negative, except as otherwise noted.    OBJECTIVE:     /88 (BP Location: Left arm, Patient Position: Chair, Cuff Size: Adult Regular)  Pulse 76  Temp 98  F (36.7  C) (Temporal)  Resp 16  Ht 5' 10\" (1.778 m)  Wt 176 lb (79.8 kg)  SpO2 100%  BMI 25.25 kg/m2  Body mass index is 25.25 kg/(m^2).   Physical Exam   Constitutional: He appears well-developed.   HENT:   Head: Normocephalic and atraumatic.   Cardiovascular: Normal rate and regular rhythm.    Pulmonary/Chest: Effort normal and breath sounds normal.   Psychiatric: He has a normal mood and affect.         Diagnostic Test Results:  none     ASSESSMENT/PLAN:     Problem List Items Addressed This Visit     Phobia, flying     Has been taking valium for flying for greater than 10 yrs. Needs 10 pills a yr. No concerns for abuse  Refills provided         Relevant Medications    diazepam (VALIUM) 10 MG tablet      Other Visit Diagnoses     Screening for lipoid disorders    -  Primary    Relevant Orders    Lipid panel reflex to direct LDL Fasting    Screening for diabetes mellitus        Relevant Orders    Glucose        Kiarra Ledesma MD  [facility]  "

## 2018-03-23 NOTE — MR AVS SNAPSHOT
"              After Visit Summary   3/23/2018    Pelon Flores    MRN: [id number]           Patient Information     Date Of Birth          1970        Visit Information        Provider Department      3/23/2018 8:00 AM Kiarra Ledesma MD [facility]        Today's Diagnoses     Screening for lipoid disorders    -  1    Situational anxiety        Screening for diabetes mellitus        Phobia, flying           Follow-ups after your visit        Follow-up notes from your care team     Return in about 1 year (around 3/23/2019) for Physical Exam.      Future tests that were ordered for you today     Open Future Orders        Priority Expected Expires Ordered    Lipid panel reflex to direct LDL Fasting Routine  6/23/2018 3/23/2018    Glucose Routine  6/23/2018 3/23/2018            Who to contact     If you have questions or need follow up information about today's clinic visit or your schedule please contact [facility] directly at [phone].  Normal or non-critical lab and imaging results will be communicated to you by MyChart, letter or phone within 4 business days after the clinic has received the results. If you do not hear from us within 7 days, please contact the clinic through [email or web address] or phone. If you have a critical or abnormal lab result, we will notify you by phone as soon as possible.  Submit refill requests through [employer] or call your pharmacy and they will forward the refill request to us. Please allow 3 business days for your refill to be completed.          Additional Information About Your Visit        [email or web address] Information     [employer] lets you send messages to your doctor, view your test results, renew your prescriptions, schedule appointments and more. To sign up, go to [email or web address] . Click on \"Log in\" on the left side of the screen, which will take you to the Welcome page. Then click on \"Sign up Now\" on the right side of the page.     You will be " "asked to enter the access code listed below, as well as some personal information. Please follow the directions to create your username and password.     Your access code is: [email or web address]  Expires: 6/3/2018  1:31 PM     Your access code will  in 90 days. If you need help or a new code, please call your [place] clinic or [phone].        Care EveryWhere ID     This is your Care EveryWhere ID. This could be used by other organizations to access your [place] medical records  [id number]        Your Vitals Were     Pulse Temperature Respirations Height Pulse Oximetry BMI (Body Mass Index)    76 98  F (36.7  C) (Temporal) 16 5' 10\" (1.778 m) 100% 25.25 kg/m2       Blood Pressure from Last 3 Encounters:   18 138/88   17 (!) 136/94   17 134/88    Weight from Last 3 Encounters:   18 176 lb (79.8 kg)   17 171 lb 9.6 oz (77.8 kg)   14 171 lb (77.6 kg)                 Today's Medication Changes          These changes are accurate as of 3/23/18  8:19 AM.  If you have any questions, ask your nurse or doctor.               Stop taking these medicines if you haven't already. Please contact your care team if you have questions.     albuterol 108 (90 BASE) MCG/ACT Inhaler   Commonly known as:  PROAIR HFA/PROVENTIL HFA/VENTOLIN HFA   Stopped by:  Kiarra Ledesma MD           omeprazole 20 MG tablet   Stopped by:  Kiarra Ledesma MD                Where to get your medicines      Some of these will need a paper prescription and others can be bought over the counter.  Ask your nurse if you have questions.     Bring a paper prescription for each of these medications     diazepam 10 MG tablet                Primary Care Provider Fax #    Physician No Ref-Primary [phone]       No address on file        Equal Access to Services     PRASAD DILL : Uday Miramontes, [place] thomas, kathleen eastman . So [facility] " [phone].    ATENCIÓN: Si tom espinoza, tiene a leahy disposición servicios gratuitos de asistencia lingüística. [place] [phone].    We comply with applicable federal civil rights laws and [place] laws. We do not discriminate on the basis of race, color, national origin, age, disability, sex, sexual orientation, or gender identity.            Thank you!     Thank you for choosing [facility]  for your care. Our goal is always to provide you with excellent care. Hearing back from our patients is one way we can continue to improve our services. Please take a few minutes to complete the written survey that you may receive in the mail after your visit with us. Thank you!             Your Updated Medication List - Protect others around you: Learn how to safely use, store and throw away your medicines at www.disposemymeds.org.          This list is accurate as of 3/23/18  8:19 AM.  Always use your most recent med list.                   Brand Name Dispense Instructions for use Diagnosis    ADVIL COLD/SINUS PO           diazepam 10 MG tablet    VALIUM    10 tablet    Take 1 tablet by mouth daily  Prior to flight    Situational anxiety       ROBITUSSIN CF PO

## 2018-03-23 NOTE — NURSING NOTE
"Chief Complaint   Patient presents with     Recheck Medication     Panel Management       Initial /88 (BP Location: Left arm, Patient Position: Chair, Cuff Size: Adult Regular)  Pulse 76  Temp 98  F (36.7  C) (Temporal)  Resp 16  Ht 5' 10\" (1.778 m)  Wt 176 lb (79.8 kg)  SpO2 100%  BMI 25.25 kg/m2 Estimated body mass index is 25.25 kg/(m^2) as calculated from the following:    Height as of this encounter: 5' 10\" (1.778 m).    Weight as of this encounter: 176 lb (79.8 kg).  Medication Reconciliation: complete   Shereen Hanks CMA (AAMA)      "

## 2018-07-19 ENCOUNTER — TELEPHONE (OUTPATIENT)
Dept: FAMILY MEDICINE | Facility: OTHER | Age: 48
End: 2018-07-19

## 2018-07-19 NOTE — TELEPHONE ENCOUNTER
Panel Management Review      Patient has the following on his problem list: None      Composite cancer screening  Chart review shows that this patient is due/due soon for the following None  Summary:    Patient is due/failing the following:   Glucose, HIV and LDL    Action needed:   Patient needs fasting lab only appointment    Type of outreach:    Phone, spoke to patient.  Patient stated he natalie call back at the end of summer, as he is out of state traveling for work a lot currently.    Questions for provider review:    None                                                                                                                                    Shereen Hanks CMA (AAMA)

## 2018-10-11 ENCOUNTER — TELEPHONE (OUTPATIENT)
Dept: FAMILY MEDICINE | Facility: OTHER | Age: 48
End: 2018-10-11

## 2018-10-11 NOTE — TELEPHONE ENCOUNTER
Patient would like to know why he needs to have fasting labs.  It is ok to leave a detailed message

## 2018-10-11 NOTE — TELEPHONE ENCOUNTER
Panel Management Review      Patient has the following on his problem list: None      Composite cancer screening  Chart review shows that this patient is due/due soon for the following None  Summary:    Patient is due/failing the following:   BMP, HIV and LDL    Action needed:   Patient needs fasting lab only appointment    Type of outreach:    Phone, left message for patient to call back.     Questions for provider review:    None                                                                                                                                    Shereen Hanks CMA (AAMA)       Chart routed to Care Team .

## 2018-10-12 NOTE — TELEPHONE ENCOUNTER
Fasting labs are to screen for high cholesterol and diabetes . HIV is offered as a one time screening to look for any exposure to HIV infection . He can decline having this done if he does not want to have it done

## 2018-10-12 NOTE — TELEPHONE ENCOUNTER
It appears this was ordered as a screening lab, will route to ordering provider (RK) to review if lab is still necessary

## 2018-10-12 NOTE — TELEPHONE ENCOUNTER
Left message with male to have patient call back clinic when he gets home. Please see message below.  dEen Gore, CMA

## 2018-10-15 NOTE — TELEPHONE ENCOUNTER
LM for patient to return phone call to clinic about message below.  Letter sent to patient.  Shereen Hanks CMA ([facility])

## 2018-12-28 ENCOUNTER — OFFICE VISIT (OUTPATIENT)
Dept: URGENT CARE | Facility: RETAIL CLINIC | Age: 48
End: 2018-12-28
Payer: COMMERCIAL

## 2018-12-28 VITALS
OXYGEN SATURATION: 97 % | HEART RATE: 79 BPM | SYSTOLIC BLOOD PRESSURE: 134 MMHG | TEMPERATURE: 97.7 F | DIASTOLIC BLOOD PRESSURE: 87 MMHG

## 2018-12-28 DIAGNOSIS — J20.9 ACUTE BRONCHITIS WITH SYMPTOMS > 10 DAYS: Primary | ICD-10-CM

## 2018-12-28 PROCEDURE — 99213 OFFICE O/P EST LOW 20 MIN: CPT | Performed by: FAMILY MEDICINE

## 2018-12-28 RX ORDER — AZITHROMYCIN 250 MG/1
TABLET, FILM COATED ORAL
Qty: 6 TABLET | Refills: 0 | Status: SHIPPED | OUTPATIENT
Start: 2018-12-28 | End: 2019-01-09

## 2018-12-28 RX ORDER — PREDNISONE 20 MG/1
TABLET ORAL
Qty: 15 TABLET | Refills: 0 | Status: SHIPPED | OUTPATIENT
Start: 2018-12-28 | End: 2019-01-22

## 2018-12-28 NOTE — PROGRESS NOTES
SUBJECTIVE:  Pelon Flores is a 48 year old male who presents to the clinic today with a chief complaint of cough  for 3 week(s).  His cough is described as persistent, productive of yellow sputum, spasmodic and can't sleep.    The patient's symptoms are severe and worsening.  Associated symptoms include nasal congestion, rhinorrhea, malaise and myalgias. The patient's symptoms are exacerbated by lying down  Patient has been using humidified air, inhaler and OTC cough suppressants  to improve symptoms.    Past Medical History:   Diagnosis Date     DEPRESSIVE PSYCHOSIS-MOD 12/2/2007     Inguinal hernia without mention of obstruction or gangrene, bilateral, (not specified as recurrent)      ISOLATED OR SPECIFIC PHOBIAS NEC 2/6/2007     Palpitations 1982    suspected recurring SVT     Current Outpatient Medications   Medication Sig Dispense Refill     azithromycin (ZITHROMAX Z-TAN) 250 MG tablet 2 tabs day one, then 1 tab daily 6 tablet 0     predniSONE (DELTASONE) 20 MG tablet 2 daily for 5 days. 1 daily for 5 days. 15 tablet 0     Pseudoephedrine-DM-GG (ROBITUSSIN CF PO)        Pseudoephedrine-Ibuprofen (ADVIL COLD/SINUS PO)        diazepam (VALIUM) 10 MG tablet Take 1 tablet by mouth daily  Prior to flight (Patient not taking: Reported on 12/28/2018) 10 tablet 0     History   Smoking Status     Never Smoker   Smokeless Tobacco     Never Used     Comment: no smokers in the household       ROS  Review of systems negative except as stated above.    OBJECTIVE:  /87   Pulse 79   Temp 97.7  F (36.5  C) (Temporal)   SpO2 97%   GENERAL APPEARANCE: moderate distress  EYES: EOMI,  PERRL, conjunctiva clear  HENT: ear canals and TM's normal.  Nose and mouth without ulcers, erythema or lesions  NECK: supple, nontender, no lymphadenopathy  RESP: inspiratory wheezes bilateral  CV: regular rates and rhythm, normal S1 S2, no murmur noted  ABDOMEN:  soft, nontender, no HSM or masses and bowel sounds normal  NEURO: Normal  strength and tone, sensory exam grossly normal,  normal speech and mentation  SKIN: no suspicious lesions or rashes    ASSESSMENT:    Acute Bronchitis  Prolonged Cough    PLAN:  Prednisone, Zpack, rest, fluids.  Symptomatic measures encouraged, humidified air, plenty of fluids.  Follow up with primary care provider if no improvement.

## 2019-01-09 ENCOUNTER — OFFICE VISIT (OUTPATIENT)
Dept: FAMILY MEDICINE | Facility: OTHER | Age: 49
End: 2019-01-09
Payer: COMMERCIAL

## 2019-01-09 DIAGNOSIS — J01.00 ACUTE NON-RECURRENT MAXILLARY SINUSITIS: Primary | ICD-10-CM

## 2019-01-09 PROCEDURE — 99213 OFFICE O/P EST LOW 20 MIN: CPT | Performed by: PHYSICIAN ASSISTANT

## 2019-01-09 ASSESSMENT — MIFFLIN-ST. JEOR: SCORE: 1671.18

## 2019-01-09 ASSESSMENT — PAIN SCALES - GENERAL: PAINLEVEL: NO PAIN (0)

## 2019-01-09 NOTE — PROGRESS NOTES
SUBJECTIVE:   Pelon Flores is a 48 year old male who presents to clinic today for the following health issues:    Acute Illness   Acute illness concerns: cough and sinus pain  Onset: cough x 1 month, sinus pain    Fever: no     Chills/Sweats: no     Headache (location?): YES    Sinus Pressure:YES    Conjunctivitis:  no    Ear Pain: no    Rhinorrhea: no     Congestion: YES    Sore Throat: no      Cough: YES    Wheeze: YES    Decreased Appetite: no     Nausea: no     Vomiting: no     Diarrhea:  no     Dysuria/Freq.: no     Fatigue/Achiness: no     Sick/Strep Exposure: no      Therapies Tried and outcome: finished zithromax 1/2/19, cold medicine    Patient presents today for evaluation of sinus congestion, pressure and pain. He reports symptoms started over 1 month ago. He was seen in urgent care and started on Azithromycin. He finished this 1/2/2019. Symptoms seem to be progressively worsening. He was having some fevers but these have resolved. He reports very productive cough in the morning that dries out during the day. Notes some wheezing at night. He does have a history of sinus infections.     Problem list and histories reviewed & adjusted, as indicated.  Additional history: as documented    Patient Active Problem List   Diagnosis     Dyspepsia and other specified disorders of function of stomach     Irritable bowel syndrome with diarrhea     Family history of diabetes mellitus     ISOLATED OR SPECIFIC PHOBIAS NEC     GERD (gastroesophageal reflux disease)     Chronic maxillary sinusitis     Phobia, flying     Past Surgical History:   Procedure Laterality Date     HERNIA REPAIR, INGUINAL RT/LT         Social History     Tobacco Use     Smoking status: Never Smoker     Smokeless tobacco: Never Used     Tobacco comment: no smokers in the household   Substance Use Topics     Alcohol use: Yes     Comment: Rare     Family History   Problem Relation Age of Onset     Depression Mother      Lipids Mother          "on med     Depression Brother         on med for treatment     Diabetes Father         type 2 age 50     Lipids Father         on med     Gastrointestinal Disease No family hx of      Heart Disease No family hx of         no arrhythmias     C.A.D. No family hx of      Cancer - colorectal No family hx of      Prostate Cancer No family hx of          Current Outpatient Medications   Medication Sig Dispense Refill     amoxicillin-clavulanate (AUGMENTIN) 875-125 MG tablet Take 1 tablet by mouth 2 times daily for 10 days 20 tablet 0     Pseudoephedrine-Ibuprofen (ADVIL COLD/SINUS PO)        diazepam (VALIUM) 10 MG tablet Take 1 tablet by mouth daily  Prior to flight (Patient not taking: Reported on 12/28/2018) 10 tablet 0     predniSONE (DELTASONE) 20 MG tablet 2 daily for 5 days. 1 daily for 5 days. (Patient not taking: Reported on 1/9/2019.) 15 tablet 0     Pseudoephedrine-DM-GG (ROBITUSSIN CF PO)        Allergies   Allergen Reactions     Nkda [No Known Drug Allergies]      BP Readings from Last 3 Encounters:   01/09/19 (P) 130/82   12/28/18 134/87   03/23/18 138/88    Wt Readings from Last 3 Encounters:   01/09/19 (P) 80.3 kg (177 lb)   03/23/18 79.8 kg (176 lb)   03/23/17 77.8 kg (171 lb 9.6 oz)         Reviewed and updated as needed this visit by clinical staff       Reviewed and updated as needed this visit by Provider       ROS:  Constitutional, HEENT, cardiovascular, pulmonary, gi and gu systems are negative, except as otherwise noted.    OBJECTIVE:     BP (P) 130/82   Pulse (P) 80   Temp (P) 98  F (36.7  C) (Temporal)   Resp (P) 14   Ht (P) 1.765 m (5' 9.5\")   Wt (P) 80.3 kg (177 lb)   SpO2 (P) 100%   BMI (P) 25.76 kg/m    Body mass index is 25.76 kg/m  (pended).  GENERAL: healthy, alert and no distress  EYES: Eyes grossly normal to inspection, PERRL and conjunctivae and sclerae normal  HENT: normal cephalic/atraumatic, ear canals and TM's normal, nose and mouth without ulcers or lesions, oropharynx " clear, oral mucous membranes moist and sinuses: maxillary tenderness on both sides, maxillary swelling on both sides  NECK: no adenopathy, no asymmetry, masses, or scars and thyroid normal to palpation  RESP: lungs clear to auscultation - no rales, rhonchi or wheezes  CV: regular rate and rhythm, normal S1 S2, no S3 or S4, no murmur, click or rub, no peripheral edema and peripheral pulses strong  SKIN: no suspicious lesions or rashes  PSYCH: mentation appears normal, affect normal/bright    Diagnostic Test Results:  none     ASSESSMENT/PLAN:     1. Acute non-recurrent maxillary sinusitis  Patient has had sinusitis symptoms for greater than 1 month. Previously treated with Azithromycin but symptoms only worsened. Discussed with patient that typically this antibiotic does not adequately treat sinus infections. Will have patient start Augmentin. Encouraged use of Flonase daily. Decongestants as needed. Patient will follow-up in clinic if new symptoms develop or current symptoms fail to improve.  - amoxicillin-clavulanate (AUGMENTIN) 875-125 MG tablet; Take 1 tablet by mouth 2 times daily for 10 days  Dispense: 20 tablet; Refill: 0    The patient indicates understanding of these issues and agrees with the plan.    Alexandra Rodriguez PA-C  [facility]

## 2019-01-22 ENCOUNTER — TELEPHONE (OUTPATIENT)
Dept: FAMILY MEDICINE | Facility: OTHER | Age: 49
End: 2019-01-22

## 2019-01-22 ENCOUNTER — OFFICE VISIT (OUTPATIENT)
Dept: FAMILY MEDICINE | Facility: OTHER | Age: 49
End: 2019-01-22
Payer: COMMERCIAL

## 2019-01-22 VITALS
WEIGHT: 175 LBS | OXYGEN SATURATION: 99 % | TEMPERATURE: 97.5 F | BODY MASS INDEX: 25.11 KG/M2 | DIASTOLIC BLOOD PRESSURE: 84 MMHG | HEART RATE: 99 BPM | SYSTOLIC BLOOD PRESSURE: 132 MMHG | RESPIRATION RATE: 16 BRPM

## 2019-01-22 DIAGNOSIS — J01.90 ACUTE SINUSITIS WITH SYMPTOMS > 10 DAYS: Primary | ICD-10-CM

## 2019-01-22 PROCEDURE — 99213 OFFICE O/P EST LOW 20 MIN: CPT | Performed by: PHYSICIAN ASSISTANT

## 2019-01-22 RX ORDER — DOXYCYCLINE 100 MG/1
100 CAPSULE ORAL 2 TIMES DAILY
Qty: 20 CAPSULE | Refills: 0 | Status: SHIPPED | OUTPATIENT
Start: 2019-01-22 | End: 2019-02-01

## 2019-01-22 ASSESSMENT — PAIN SCALES - GENERAL: PAINLEVEL: NO PAIN (0)

## 2019-01-22 NOTE — TELEPHONE ENCOUNTER
Reason for Call:  Same Day Appointment, Requested Provider:  Alexandra Ley    PCP: No Ref-Primary, Physician    Reason for visit: sinus infection not better    Duration of symptoms: seen 01/09/2019    Have you been treated for this in the past? Yes    Additional comments: patient is asking to see Alexandra Ley today.    Can we leave a detailed message on this number? YES    Phone number patient can be reached at: Cell number on file:    Telephone Information:   Mobile [phone]       Best Time: anytime    Call taken on 1/22/2019 at 10:43 AM by Yuki Paiz

## 2019-01-22 NOTE — PROGRESS NOTES
SUBJECTIVE:   Pelon Flores is a 48 year old male who presents to clinic today for the following health issues:      HPI  Acute Illness   Acute illness concerns: cough congestion  Onset: 6 weeks    Fever: no     Chills/Sweats: no     Headache (location?): no     Sinus Pressure:YES    Conjunctivitis:  no    Ear Pain: no    Rhinorrhea: YES    Congestion: YES    Sore Throat: no      Cough: YES    Wheeze: YES    Decreased Appetite: no     Nausea: no     Vomiting: no     Diarrhea:  no     Dysuria/Freq.: no     Fatigue/Achiness: no     Sick/Strep Exposure: no      Patient presents today with recurring sinus symptoms. Symptoms have been present for about 6 weeks. He has completed 1 course of antibiotics with some improvement. He reports shortly after finishing this symptoms returned. Now having worsening sinus pressure, postnasal drop, cough that is productive and some wheezing. He denies fevers. He reports having a sinus infection once a year but nothing typically lasting this long.     Problem list and histories reviewed & adjusted, as indicated.  Additional history: as documented    Patient Active Problem List   Diagnosis     Dyspepsia and other specified disorders of function of stomach     Irritable bowel syndrome with diarrhea     Family history of diabetes mellitus     ISOLATED OR SPECIFIC PHOBIAS NEC     GERD (gastroesophageal reflux disease)     Chronic maxillary sinusitis     Phobia, flying     Past Surgical History:   Procedure Laterality Date     HERNIA REPAIR, INGUINAL RT/LT         Social History     Tobacco Use     Smoking status: Never Smoker     Smokeless tobacco: Never Used     Tobacco comment: no smokers in the household   Substance Use Topics     Alcohol use: Yes     Comment: Rare     Family History   Problem Relation Age of Onset     Depression Mother      Lipids Mother         on med     Depression Brother         on med for treatment     Diabetes Father         type 2 age 50     Lipids Father          on med     Gastrointestinal Disease No family hx of      Heart Disease No family hx of         no arrhythmias     C.A.D. No family hx of      Cancer - colorectal No family hx of      Prostate Cancer No family hx of          Current Outpatient Medications   Medication Sig Dispense Refill     doxycycline hyclate (VIBRAMYCIN) 100 MG capsule Take 1 capsule (100 mg) by mouth 2 times daily for 10 days 20 capsule 0     diazepam (VALIUM) 10 MG tablet Take 1 tablet by mouth daily  Prior to flight (Patient not taking: Reported on 12/28/2018) 10 tablet 0     Allergies   Allergen Reactions     Nkda [No Known Drug Allergies]      BP Readings from Last 3 Encounters:   01/22/19 132/84   01/09/19 (P) 130/82   12/28/18 134/87    Wt Readings from Last 3 Encounters:   01/22/19 79.4 kg (175 lb)   01/09/19 (P) 80.3 kg (177 lb)   03/23/18 79.8 kg (176 lb)          ROS:  Constitutional, HEENT, cardiovascular, pulmonary, gi and gu systems are negative, except as otherwise noted.    OBJECTIVE:     /84   Pulse 99   Temp 97.5  F (36.4  C) (Temporal)   Resp 16   Wt 79.4 kg (175 lb)   SpO2 99%   BMI (P) 25.47 kg/m    Body mass index is 25.47 kg/m  (pended).  GENERAL: healthy, alert and no distress  EYES: Eyes grossly normal to inspection, PERRL and conjunctivae and sclerae normal  HENT: normal cephalic/atraumatic, ear canals and TM's normal, nose and mouth without ulcers or lesions, oropharynx clear, oral mucous membranes moist and sinuses: maxillary tenderness on bilateral, maxillary swelling on both sides  NECK: no adenopathy, no asymmetry, masses, or scars and thyroid normal to palpation  RESP: lungs clear to auscultation - no rales, rhonchi or wheezes  CV: regular rate and rhythm, normal S1 S2, no S3 or S4, no murmur, click or rub, no peripheral edema and peripheral pulses strong  SKIN: no suspicious lesions or rashes  PSYCH: mentation appears normal, affect normal/bright    Diagnostic Test Results:  none      ASSESSMENT/PLAN:     1. Acute sinusitis with symptoms > 10 days  Patient with recurring symptoms. Will start Doxycycline at this time. Again recommended routine use of Flonase. Will need to consider ENT referral if symptoms persisting.   - doxycycline hyclate (VIBRAMYCIN) 100 MG capsule; Take 1 capsule (100 mg) by mouth 2 times daily for 10 days  Dispense: 20 capsule; Refill: 0    The patient indicates understanding of these issues and agrees with the plan.    Alexandra Rodriguez PA-C  [facility]

## 2019-02-25 ENCOUNTER — OFFICE VISIT (OUTPATIENT)
Dept: FAMILY MEDICINE | Facility: OTHER | Age: 49
End: 2019-02-25
Payer: COMMERCIAL

## 2019-02-25 VITALS
TEMPERATURE: 97.6 F | HEART RATE: 96 BPM | WEIGHT: 177.2 LBS | RESPIRATION RATE: 16 BRPM | DIASTOLIC BLOOD PRESSURE: 80 MMHG | HEIGHT: 69 IN | SYSTOLIC BLOOD PRESSURE: 126 MMHG | BODY MASS INDEX: 26.25 KG/M2

## 2019-02-25 DIAGNOSIS — F40.243 PHOBIA, FLYING: Primary | ICD-10-CM

## 2019-02-25 PROCEDURE — 99212 OFFICE O/P EST SF 10 MIN: CPT | Performed by: PHYSICIAN ASSISTANT

## 2019-02-25 RX ORDER — DIAZEPAM 10 MG
TABLET ORAL
Qty: 10 TABLET | Refills: 0 | Status: SHIPPED | OUTPATIENT
Start: 2019-02-25 | End: 2020-03-02

## 2019-02-25 ASSESSMENT — ANXIETY QUESTIONNAIRES
GAD7 TOTAL SCORE: 0
GAD7 TOTAL SCORE: 0
7. FEELING AFRAID AS IF SOMETHING AWFUL MIGHT HAPPEN: NOT AT ALL
6. BECOMING EASILY ANNOYED OR IRRITABLE: NOT AT ALL
GAD7 TOTAL SCORE: 0
4. TROUBLE RELAXING: NOT AT ALL
7. FEELING AFRAID AS IF SOMETHING AWFUL MIGHT HAPPEN: NOT AT ALL
3. WORRYING TOO MUCH ABOUT DIFFERENT THINGS: NOT AT ALL
2. NOT BEING ABLE TO STOP OR CONTROL WORRYING: NOT AT ALL
5. BEING SO RESTLESS THAT IT IS HARD TO SIT STILL: NOT AT ALL
1. FEELING NERVOUS, ANXIOUS, OR ON EDGE: NOT AT ALL

## 2019-02-25 ASSESSMENT — PATIENT HEALTH QUESTIONNAIRE - PHQ9
10. IF YOU CHECKED OFF ANY PROBLEMS, HOW DIFFICULT HAVE THESE PROBLEMS MADE IT FOR YOU TO DO YOUR WORK, TAKE CARE OF THINGS AT HOME, OR GET ALONG WITH OTHER PEOPLE: NOT DIFFICULT AT ALL
SUM OF ALL RESPONSES TO PHQ QUESTIONS 1-9: 0
SUM OF ALL RESPONSES TO PHQ QUESTIONS 1-9: 0

## 2019-02-25 ASSESSMENT — MIFFLIN-ST. JEOR: SCORE: 1671.89

## 2019-02-25 NOTE — PROGRESS NOTES
SUBJECTIVE:   Pelon Flores is a 48 year old male who presents to clinic today for the following health issues:    HPI    Patient is here for refill on diazepam for flying. 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 He is also going to [place] for vacation 4 times total in the next 1.5 months. He has been using this medication for flying only for the last 8-10 years. This works very well for him. No side effects present.     Medication Followup of Diazepam    Taking Medication as prescribed: yes    Side Effects:  None    Medication Helping Symptoms:  Yes     Answers for HPI/ROS submitted by the patient on 2/25/2019   If you checked off any problems, how difficult have these problems made it for you to do your work, take care of things at home, or get along with other people?: Not difficult at all  PHQ9 TOTAL SCORE: 0  ERICA 7 TOTAL SCORE: 0    Problem list and histories reviewed & adjusted, as indicated.  Additional history: as documented    Patient Active Problem List   Diagnosis     Dyspepsia and other specified disorders of function of stomach     Irritable bowel syndrome with diarrhea     Family history of diabetes mellitus     ISOLATED OR SPECIFIC PHOBIAS NEC     GERD (gastroesophageal reflux disease)     Chronic maxillary sinusitis     Phobia, flying     Past Surgical History:   Procedure Laterality Date     HERNIA REPAIR, INGUINAL RT/LT         Social History     Tobacco Use     Smoking status: Never Smoker     Smokeless tobacco: Never Used     Tobacco comment: no smokers in the household   Substance Use Topics     Alcohol use: Yes     Comment: Rare     Family History   Problem Relation Age of Onset     Depression Mother      Lipids Mother         on med     Depression Brother         on med for treatment     Diabetes Father         type 2 age 50     Lipids Father         on med     Gastrointestinal Disease No family hx of      Heart Disease No family hx of         no arrhythmias     C.A.D. No  "family hx of      Cancer - colorectal No family hx of      Prostate Cancer No family hx of          Current Outpatient Medications   Medication Sig Dispense Refill     diazepam (VALIUM) 10 MG tablet Take 1 tablet by mouth daily  Prior to flight 10 tablet 0     Allergies   Allergen Reactions     Nkda [No Known Drug Allergies]      BP Readings from Last 3 Encounters:   02/25/19 126/80   01/22/19 132/84   01/09/19 (P) 130/82    Wt Readings from Last 3 Encounters:   02/25/19 80.4 kg (177 lb 3.2 oz)   01/22/19 79.4 kg (175 lb)   01/09/19 (P) 80.3 kg (177 lb)         ROS:  Constitutional, HEENT, cardiovascular, pulmonary, gi and gu systems are negative, except as otherwise noted.    OBJECTIVE:     /80   Pulse 96   Temp 97.6  F (36.4  C) (Temporal)   Resp 16   Ht 1.765 m (5' 9.49\")   Wt 80.4 kg (177 lb 3.2 oz)   BMI 25.80 kg/m    Body mass index is 25.8 kg/m .  GENERAL: healthy, alert and no distress  SKIN: no suspicious lesions or rashes  PSYCH: mentation appears normal, affect normal/bright    Diagnostic Test Results:  none     ASSESSMENT/PLAN:     1. Phobia, flying  Patient uses medication only with flying. This has worked very well. 10 tablets typically lasts him the full year. No tolerance/abuse suspected.   - diazepam (VALIUM) 10 MG tablet; Take 1 tablet by mouth daily  Prior to flight  Dispense: 10 tablet; Refill: 0    The patient indicates understanding of these issues and agrees with the plan.    Alexandra Rodriguez PA-C  [facility]  "

## 2019-02-26 ASSESSMENT — ANXIETY QUESTIONNAIRES: GAD7 TOTAL SCORE: 0

## 2019-02-26 ASSESSMENT — PATIENT HEALTH QUESTIONNAIRE - PHQ9: SUM OF ALL RESPONSES TO PHQ QUESTIONS 1-9: 0

## 2020-02-28 ENCOUNTER — TELEPHONE (OUTPATIENT)
Dept: FAMILY MEDICINE | Facility: OTHER | Age: 50
End: 2020-02-28

## 2020-02-28 NOTE — TELEPHONE ENCOUNTER
Patient notified that it has been over a year since his last visit. Patient advised needs an appointment to discuss medications. Appointment scheduled with Suyapa MOSS on Monday 3/2/20.

## 2020-02-28 NOTE — TELEPHONE ENCOUNTER
Reason for Call:  Medication or medication refill:    Do you use a [facility]?  Name of the pharmacy and phone number for the current request:  [facility] - [phone]    Name of the medication requested: diazepam (VALIUM) 10 MG tablet    Other request: Patient has to fly Wednesday and he is out.      Can we leave a detailed message on this number? YES    Phone number patient can be reached at: Cell number on file:    Telephone Information:   Mobile [phone]       Best Time: any    Call taken on 2/28/2020 at 12:43 PM by Myrna Martinez

## 2020-02-28 NOTE — PROGRESS NOTES
"Subjective     Pelon Flores is a 49 year old male who presents to clinic today for the following health issues:    HPI   {SUPERLIST (Optional):644610}  {additonal problems for provider to add (Optional):521071}    {HIST REVIEW/ LINKS 2 (Optional):481719}    Reviewed and updated as needed this visit by Provider         Review of Systems   {ROS COMP (Optional):545963}      Objective    There were no vitals taken for this visit.  There is no height or weight on file to calculate BMI.  Physical Exam   {Exam List (Optional):446112}    {Diagnostic Test Results (Optional):817621::\"Diagnostic Test Results:\",\"Labs reviewed in Epic\"}        {PROVIDER CHARTING PREFERENCE:485641}    "

## 2020-03-02 ENCOUNTER — OFFICE VISIT (OUTPATIENT)
Dept: FAMILY MEDICINE | Facility: OTHER | Age: 50
End: 2020-03-02
Payer: COMMERCIAL

## 2020-03-02 VITALS
HEART RATE: 99 BPM | DIASTOLIC BLOOD PRESSURE: 80 MMHG | HEIGHT: 69 IN | TEMPERATURE: 98.2 F | RESPIRATION RATE: 16 BRPM | BODY MASS INDEX: 25.62 KG/M2 | OXYGEN SATURATION: 94 % | WEIGHT: 173 LBS | SYSTOLIC BLOOD PRESSURE: 130 MMHG

## 2020-03-02 DIAGNOSIS — F40.243 PHOBIA, FLYING: ICD-10-CM

## 2020-03-02 PROCEDURE — 99213 OFFICE O/P EST LOW 20 MIN: CPT | Performed by: NURSE PRACTITIONER

## 2020-03-02 RX ORDER — DIAZEPAM 10 MG
TABLET ORAL
Qty: 10 TABLET | Refills: 0 | Status: SHIPPED | OUTPATIENT
Start: 2020-03-02 | End: 2021-03-09

## 2020-03-02 ASSESSMENT — MIFFLIN-ST. JEOR: SCORE: 1632.22

## 2020-03-02 NOTE — PROGRESS NOTES
Subjective     Pelon Flores is a 49 year old male who presents to clinic today for the following health issues:    HPI     Concern - Medication for flying       Description:   Pt has been taking valium 10 mg per flight. Pt has multiple flights coming up for vacation/work flights.     Patient has long standing h/o flying phobia he has used Valium 10 mg for this for > 10 years and states this works well for him in past he denies s/e. Is requesting a refill.       Patient Active Problem List   Diagnosis     Dyspepsia and other specified disorders of function of stomach     Irritable bowel syndrome with diarrhea     Family history of diabetes mellitus     ISOLATED OR SPECIFIC PHOBIAS NEC     GERD (gastroesophageal reflux disease)     Chronic maxillary sinusitis     Phobia, flying     Past Surgical History:   Procedure Laterality Date     HERNIA REPAIR, INGUINAL RT/LT         Social History     Tobacco Use     Smoking status: Never Smoker     Smokeless tobacco: Never Used     Tobacco comment: no smokers in the household   Substance Use Topics     Alcohol use: Yes     Comment: Rare     Family History   Problem Relation Age of Onset     Depression Mother      Lipids Mother         on med     Depression Brother         on med for treatment     Diabetes Father         type 2 age 50     Lipids Father         on med     Gastrointestinal Disease No family hx of      Heart Disease No family hx of         no arrhythmias     C.A.D. No family hx of      Cancer - colorectal No family hx of      Prostate Cancer No family hx of          Current Outpatient Medications   Medication Sig Dispense Refill     diazepam (VALIUM) 10 MG tablet Take 1 tablet by mouth daily  Prior to flight 10 tablet 0     Allergies   Allergen Reactions     Nkda [No Known Drug Allergies]      Recent Labs   Lab Test 07/23/12  0933      HDL 34*   TRIG 69      BP Readings from Last 3 Encounters:   03/02/20 130/80   02/25/19 126/80   01/22/19 132/84    Wt  "Readings from Last 3 Encounters:   03/02/20 78.5 kg (173 lb)   02/25/19 80.4 kg (177 lb 3.2 oz)   01/22/19 79.4 kg (175 lb)        Reviewed and updated as needed this visit by Provider    Review of Systems   ROS COMP: Constitutional, HEENT, cardiovascular, pulmonary, GI, , musculoskeletal, neuro, skin, endocrine and psych systems are negative, except as otherwise noted.      Objective    /80   Pulse 99   Temp 98.2  F (36.8  C) (Temporal)   Resp 16   Ht 1.74 m (5' 8.5\")   Wt 78.5 kg (173 lb)   SpO2 94%   BMI 25.92 kg/m    Body mass index is 25.92 kg/m .  Physical Exam   GENERAL: healthy, alert and no distress  NECK: no adenopathy, no asymmetry, masses, or scars and thyroid normal to palpation  RESP: lungs clear to auscultation - no rales, rhonchi or wheezes  CV: regular rate and rhythm, normal S1 S2, no S3 or S4, no murmur, click or rub, no peripheral edema and peripheral pulses strong  MS: no gross musculoskeletal defects noted, no edema         Assessment & Plan     1. Phobia, flying  Refill given denies s/e no change in dosing.   - diazepam (VALIUM) 10 MG tablet; Take 1 tablet by mouth daily  Prior to flight  Dispense: 10 tablet; Refill: 0  Side effects of medications, proper use, the associated risk/benefits and other options were discussed. Patient understands these risks and agrees to take the medication as instructed.        BMI:   Estimated body mass index is 25.92 kg/m  as calculated from the following:    Height as of this encounter: 1.74 m (5' 8.5\").    Weight as of this encounter: 78.5 kg (173 lb).         NICOLE Ballard [facility]    "

## 2021-03-02 DIAGNOSIS — F40.243 PHOBIA, FLYING: ICD-10-CM

## 2021-03-02 NOTE — TELEPHONE ENCOUNTER
Pending Prescriptions:                       Disp   Refills    diazepam (VALIUM) 10 MG tablet             10 tab*0        Sig: Take 1 tablet by mouth daily  Prior to flight        Routing refill request to provider for review/approval because:  Drug not on the [facility] [place] protocol   Last Seen: 03/2/20  Last Refilled: 03/2/20   Next Visit: MARÍA Rubin RN, BSN  [place]  March 2, 2021

## 2021-03-03 RX ORDER — DIAZEPAM 10 MG
TABLET ORAL
Qty: 10 TABLET | Refills: 0 | OUTPATIENT
Start: 2021-03-03

## 2021-03-03 NOTE — TELEPHONE ENCOUNTER
Prior to refill patient will need visit due to it has been year since last seen for issue.   Recommend physical as well he is overdue   Thank you  Suyapa Meza CNP

## 2021-03-04 NOTE — TELEPHONE ENCOUNTER
Patient returned call and informed of providers message below of need for a visit for refilling of his requested medication. Patient very adamit that he does not want to come into the clinic for an appointment. Questioned if a phone visit could be done? Informed we could schedule a virtual visit to discuss med requested. Patient is scheduled for a video visit on 3/9 at 7:00 am. Mickie Vogt LPN

## 2021-03-05 NOTE — PROGRESS NOTES
Pelon is a 50 year old who is being evaluated via a billable video visit.      How would you like to obtain your AVS? Mail a copy  If the video visit is dropped, the invitation should be resent by: Text to cell phone: [phone]  Will anyone else be joining your video visit? No  Video Start Time: 0720    Assessment & Plan     Phobia, flying  Refill given denies s/e. Home care instructions were reviewed with the patient. The risks, benefits and treatment options of prescribed medications or other treatments have been discussed with the patient. The patient verbalized their understanding and should call or follow up if no improvement or if they develop further problems.    - diazepam (VALIUM) 10 MG tablet; Take 1 tablet by mouth daily  Prior to flight    Due for HM he has not wanted to come into clinic due to Covid 19 states once this settles more he will. Has been avoiding, discussed importance of screening he will be due for colonoscopy, immunization (zoster), and fasting labs.            Patient Instructions   Please return to clinic for fasting labs and physical.     Thank you  Suyapa Meza [facility] TIKA Bird is a 50 year old who presents for the following health issues     HPI       Patient is here to get a refill on his Diazepam for when he flies. He does not want to come into the clinic because he got sick the last time he came into the clinic before he had to fly. 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 This works well for him. He likes that it just calms him and doesn't make him groggy.     Did have a physical for is life insurance. This was in November, he had blood work and a EKG done.       Review of Systems   Constitutional, HEENT, cardiovascular, pulmonary, GI, , musculoskeletal, neuro, skin, endocrine and psych systems are  negative, except as otherwise noted.      Objective           Vitals:  No vitals were obtained today due to virtual visit.    Physical Exam   GENERAL: Healthy, alert and no distress  EYES: Eyes grossly normal to inspection.  No discharge or erythema, or obvious scleral/conjunctival abnormalities.  RESP: No audible wheeze, cough, or visible cyanosis.  No visible retractions or increased work of breathing.    SKIN: Visible skin clear. No significant rash, abnormal pigmentation or lesions.  NEURO: Cranial nerves grossly intact.  Mentation and speech appropriate for age.  PSYCH: Mentation appears normal, affect normal/bright, judgement and insight intact, normal speech and appearance well-groomed.          Video-Visit Details    Type of service:  Video Visit    Video End Time:0733    Originating Location (pt. Location): Home    Distant Location (provider location):  [facility] [employer]     Platform used for Video Visit: [employer]

## 2021-03-09 ENCOUNTER — MYC MEDICAL ADVICE (OUTPATIENT)
Dept: FAMILY MEDICINE | Facility: CLINIC | Age: 51
End: 2021-03-09

## 2021-03-09 ENCOUNTER — VIRTUAL VISIT (OUTPATIENT)
Dept: FAMILY MEDICINE | Facility: CLINIC | Age: 51
End: 2021-03-09
Payer: COMMERCIAL

## 2021-03-09 DIAGNOSIS — F40.243 PHOBIA, FLYING: ICD-10-CM

## 2021-03-09 PROCEDURE — 99213 OFFICE O/P EST LOW 20 MIN: CPT | Mod: 95 | Performed by: NURSE PRACTITIONER

## 2021-03-09 RX ORDER — DIAZEPAM 10 MG
TABLET ORAL
Qty: 20 TABLET | Refills: 0 | Status: SHIPPED | OUTPATIENT
Start: 2021-03-09 | End: 2022-04-21

## 2021-04-04 ENCOUNTER — HEALTH MAINTENANCE LETTER (OUTPATIENT)
Age: 51
End: 2021-04-04

## 2021-07-14 ENCOUNTER — APPOINTMENT (OUTPATIENT)
Dept: GENERAL RADIOLOGY | Facility: CLINIC | Age: 51
End: 2021-07-14
Attending: FAMILY MEDICINE
Payer: COMMERCIAL

## 2021-07-14 ENCOUNTER — HOSPITAL ENCOUNTER (EMERGENCY)
Facility: CLINIC | Age: 51
Discharge: HOME | End: 2021-07-14
Attending: FAMILY MEDICINE | Admitting: FAMILY MEDICINE
Payer: COMMERCIAL

## 2021-07-14 VITALS
WEIGHT: 169 LBS | RESPIRATION RATE: 16 BRPM | TEMPERATURE: 99.4 F | DIASTOLIC BLOOD PRESSURE: 78 MMHG | BODY MASS INDEX: 25.32 KG/M2 | SYSTOLIC BLOOD PRESSURE: 110 MMHG | OXYGEN SATURATION: 99 % | HEART RATE: 72 BPM

## 2021-07-14 DIAGNOSIS — R19.7 DIARRHEA, UNSPECIFIED TYPE: ICD-10-CM

## 2021-07-14 DIAGNOSIS — U07.1 2019 NOVEL CORONAVIRUS DISEASE (COVID-19): ICD-10-CM

## 2021-07-14 DIAGNOSIS — R11.0 NAUSEA: ICD-10-CM

## 2021-07-14 DIAGNOSIS — E86.0 DEHYDRATION: ICD-10-CM

## 2021-07-14 LAB
ALBUMIN SERPL-MCNC: 3.6 G/DL (ref 3.4–5)
ALP SERPL-CCNC: 58 U/L (ref 40–150)
ALT SERPL W P-5'-P-CCNC: 38 U/L (ref 0–70)
ANION GAP SERPL CALCULATED.3IONS-SCNC: 4 MMOL/L (ref 3–14)
AST SERPL W P-5'-P-CCNC: 32 U/L (ref 0–45)
BASOPHILS # BLD AUTO: 0 10E3/UL (ref 0–0.2)
BASOPHILS NFR BLD AUTO: 0 %
BILIRUB SERPL-MCNC: 0.5 MG/DL (ref 0.2–1.3)
BUN SERPL-MCNC: 14 MG/DL (ref 7–30)
CALCIUM SERPL-MCNC: 8.2 MG/DL (ref 8.5–10.1)
CHLORIDE BLD-SCNC: 103 MMOL/L (ref 94–109)
CO2 SERPL-SCNC: 28 MMOL/L (ref 20–32)
CREAT SERPL-MCNC: 1.08 MG/DL (ref 0.66–1.25)
EOSINOPHIL # BLD AUTO: 0 10E3/UL (ref 0–0.7)
EOSINOPHIL NFR BLD AUTO: 0 %
ERYTHROCYTE [DISTWIDTH] IN BLOOD BY AUTOMATED COUNT: 13 % (ref 10–15)
GFR SERPL CREATININE-BSD FRML MDRD: 79 ML/MIN/1.73M2
GLUCOSE BLD-MCNC: 103 MG/DL (ref 70–99)
HCT VFR BLD AUTO: 45.8 % (ref 40–53)
HGB BLD-MCNC: 15.5 G/DL (ref 13.3–17.7)
IMM GRANULOCYTES # BLD: 0 10E3/UL
IMM GRANULOCYTES NFR BLD: 0 %
LYMPHOCYTES # BLD AUTO: 0.5 10E3/UL (ref 0.8–5.3)
LYMPHOCYTES NFR BLD AUTO: 10 %
MCH RBC QN AUTO: 30 PG (ref 26.5–33)
MCHC RBC AUTO-ENTMCNC: 33.8 G/DL (ref 31.5–36.5)
MCV RBC AUTO: 89 FL (ref 78–100)
MONOCYTES # BLD AUTO: 0.4 10E3/UL (ref 0–1.3)
MONOCYTES NFR BLD AUTO: 8 %
NEUTROPHILS # BLD AUTO: 3.9 10E3/UL (ref 1.6–8.3)
NEUTROPHILS NFR BLD AUTO: 82 %
NRBC # BLD AUTO: 0 10E3/UL
NRBC BLD AUTO-RTO: 0 /100
PLATELET # BLD AUTO: 213 10E3/UL (ref 150–450)
POTASSIUM BLD-SCNC: 4.3 MMOL/L (ref 3.4–5.3)
PROT SERPL-MCNC: 7.3 G/DL (ref 6.8–8.8)
RBC # BLD AUTO: 5.17 10E6/UL (ref 4.4–5.9)
SARS-COV-2 RNA RESP QL NAA+PROBE: POSITIVE
SODIUM SERPL-SCNC: 135 MMOL/L (ref 133–144)
WBC # BLD AUTO: 4.8 10E3/UL (ref 4–11)

## 2021-07-14 PROCEDURE — 80053 COMPREHEN METABOLIC PANEL: CPT | Performed by: FAMILY MEDICINE

## 2021-07-14 PROCEDURE — 99284 EMERGENCY DEPT VISIT MOD MDM: CPT | Performed by: FAMILY MEDICINE

## 2021-07-14 PROCEDURE — 96374 THER/PROPH/DIAG INJ IV PUSH: CPT | Performed by: FAMILY MEDICINE

## 2021-07-14 PROCEDURE — 85025 COMPLETE CBC W/AUTO DIFF WBC: CPT | Performed by: FAMILY MEDICINE

## 2021-07-14 PROCEDURE — C9803 HOPD COVID-19 SPEC COLLECT: HCPCS | Performed by: FAMILY MEDICINE

## 2021-07-14 PROCEDURE — 36415 COLL VENOUS BLD VENIPUNCTURE: CPT | Performed by: FAMILY MEDICINE

## 2021-07-14 PROCEDURE — 96361 HYDRATE IV INFUSION ADD-ON: CPT | Performed by: FAMILY MEDICINE

## 2021-07-14 PROCEDURE — 250N000011 HC RX IP 250 OP 636: Performed by: FAMILY MEDICINE

## 2021-07-14 PROCEDURE — 258N000003 HC RX IP 258 OP 636: Performed by: FAMILY MEDICINE

## 2021-07-14 PROCEDURE — 87635 SARS-COV-2 COVID-19 AMP PRB: CPT | Performed by: FAMILY MEDICINE

## 2021-07-14 PROCEDURE — 36592 COLLECT BLOOD FROM PICC: CPT | Performed by: FAMILY MEDICINE

## 2021-07-14 PROCEDURE — 99284 EMERGENCY DEPT VISIT MOD MDM: CPT | Mod: 25 | Performed by: FAMILY MEDICINE

## 2021-07-14 PROCEDURE — 71045 X-RAY EXAM CHEST 1 VIEW: CPT

## 2021-07-14 RX ORDER — GUAIFENESIN/DEXTROMETHORPHAN 100-10MG/5
10 SYRUP ORAL EVERY 4 HOURS PRN
COMMUNITY
End: 2022-09-27

## 2021-07-14 RX ORDER — IBUPROFEN AND PSEUDOEPHEDRINE HYDROCHLORIDE 200; 30 MG/1; MG/1
2 TABLET, COATED ORAL EVERY 4 HOURS PRN
COMMUNITY
End: 2022-09-27

## 2021-07-14 RX ORDER — ONDANSETRON 4 MG/1
4 TABLET, ORALLY DISINTEGRATING ORAL EVERY 6 HOURS PRN
Qty: 12 TABLET | Refills: 0 | Status: SHIPPED | OUTPATIENT
Start: 2021-07-14 | End: 2022-09-27

## 2021-07-14 RX ORDER — KETOROLAC TROMETHAMINE 30 MG/ML
30 INJECTION, SOLUTION INTRAMUSCULAR; INTRAVENOUS ONCE
Status: COMPLETED | OUTPATIENT
Start: 2021-07-14 | End: 2021-07-14

## 2021-07-14 RX ORDER — ONDANSETRON 2 MG/ML
4 INJECTION INTRAMUSCULAR; INTRAVENOUS EVERY 30 MIN PRN
Status: DISCONTINUED | OUTPATIENT
Start: 2021-07-14 | End: 2021-07-14 | Stop reason: HOSPADM

## 2021-07-14 RX ADMIN — SODIUM CHLORIDE, POTASSIUM CHLORIDE, SODIUM LACTATE AND CALCIUM CHLORIDE 1000 ML: 600; 310; 30; 20 INJECTION, SOLUTION INTRAVENOUS at 11:20

## 2021-07-14 RX ADMIN — ONDANSETRON 4 MG: 2 INJECTION INTRAMUSCULAR; INTRAVENOUS at 11:23

## 2021-07-14 RX ADMIN — SODIUM CHLORIDE, POTASSIUM CHLORIDE, SODIUM LACTATE AND CALCIUM CHLORIDE 1000 ML: 600; 310; 30; 20 INJECTION, SOLUTION INTRAVENOUS at 12:42

## 2021-07-14 RX ADMIN — KETOROLAC TROMETHAMINE 30 MG: 30 INJECTION, SOLUTION INTRAMUSCULAR at 11:20

## 2021-07-14 NOTE — ED PROVIDER NOTES
History     Chief Complaint   Patient presents with     Cough     Fever     Fatigue     HPI  Pelon Flores is a 51 year old male who presents to the ED today with 5-day history of cough chills fever weakness and now developed watery diarrhea for the past 2 days.  Every time he tries to eat or drink, watery stool just comes up the other end.  Feels lightheaded when he changes position.  Wife and daughter have had similar symptoms unknown not as bad as him.  He is otherwise in good health.  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  Has not had Covid nor has he been vaccinated.    Some drainage down the back which causes nausea but has not been able to vomit.    Allergies:  Allergies   Allergen Reactions     Nkda [No Known Drug Allergies]        Problem List:    Patient Active Problem List    Diagnosis Date Noted     Phobia, flying 03/23/2018     Priority: Medium     Chronic maxillary sinusitis 02/21/2013     Priority: Medium     GERD (gastroesophageal reflux disease) 10/24/2011     Priority: Medium     Irritable bowel syndrome with diarrhea 01/28/2010     Priority: Medium     Family history of diabetes mellitus 01/28/2010     Priority: Medium     ISOLATED OR SPECIFIC PHOBIAS NEC 02/06/2007     Priority: Medium     Dyspepsia and other specified disorders of function of stomach 12/08/2006     Priority: Medium        Past Medical History:    Past Medical History:   Diagnosis Date     DEPRESSIVE PSYCHOSIS-MOD 12/2/2007     Inguinal hernia without mention of obstruction or gangrene, bilateral, (not specified as recurrent)      ISOLATED OR SPECIFIC PHOBIAS NEC 2/6/2007     Palpitations 1982       Past Surgical History:    Past Surgical History:   Procedure Laterality Date     HERNIA REPAIR, INGUINAL RT/LT         Family History:    Family History   Problem Relation Age of Onset     Depression Mother      Lipids Mother         on med     Depression Brother         on med for treatment     Diabetes Father         type 2 age  50     Lipids Father         on med     Gastrointestinal Disease No family hx of      Heart Disease No family hx of         no arrhythmias     C.A.D. No family hx of      Cancer - colorectal No family hx of      Prostate Cancer No family hx of        Social History:  Marital Status:   [2]  Social History     Tobacco Use     Smoking status: Never Smoker     Smokeless tobacco: Never Used     Tobacco comment: no smokers in the household   Substance Use Topics     Alcohol use: Yes     Comment: Rare     Drug use: No        Medications:    diazepam (VALIUM) 10 MG tablet  guaiFENesin-dextromethorphan (ROBITUSSIN DM) 100-10 MG/5ML syrup  ondansetron (ZOFRAN ODT) 4 MG ODT tab  Pseudoephedrine-Ibuprofen (ADVIL COLD/SINUS)  MG TABS          Review of Systems   All other systems reviewed and are negative.      Physical Exam   BP: 116/79  Pulse: 87  Temp: 99.4  F (37.4  C)  Resp: 16  Weight: 76.7 kg (169 lb)  SpO2: 98 %      Physical Exam  Constitutional:       General: He is in acute distress (mil).      Appearance: Normal appearance.   HENT:      Mouth/Throat:      Pharynx: Oropharynx is clear.      Comments: Oral mucosa is tacky.  Eyes:      Extraocular Movements: Extraocular movements intact.   Cardiovascular:      Rate and Rhythm: Normal rate and regular rhythm.   Pulmonary:      Effort: Pulmonary effort is normal.      Breath sounds: Normal breath sounds.   Abdominal:      Palpations: Abdomen is soft.      Tenderness: There is abdominal tenderness (mild RLQ).   Musculoskeletal:         General: Normal range of motion.   Skin:     General: Skin is warm and dry.   Neurological:      General: No focal deficit present.      Mental Status: He is alert and oriented to person, place, and time.         ED Course  (with Medical Decision Making)      IV placed.  Labs sent.  2 L of LR were ordered.  He felt much better after the IV Zofran.  COVID-19 came back positive which is not surprising.  He will quarantine at home  according to the CDC guidelines.  Family members are also weakly positive.    He was able to tolerate oral liquids and some solid food after the first liter was in.  Feeling much better after the second liter and feels ready to go home.  Zofran ODT prescription sent to his pharmacy.  Verbal and written discharge instructions given including Covid resources and Get Well Loop.  He is comfortable with this plan.          Procedures              Critical Care time:  none               Results for orders placed or performed during the hospital encounter of 07/14/21 (from the past 24 hour(s))   Comprehensive metabolic panel   Result Value Ref Range    Sodium 135 133 - 144 mmol/L    Potassium 4.3 3.4 - 5.3 mmol/L    Chloride 103 94 - 109 mmol/L    Carbon Dioxide (CO2) 28 20 - 32 mmol/L    Anion Gap 4 3 - 14 mmol/L    Urea Nitrogen 14 7 - 30 mg/dL    Creatinine 1.08 0.66 - 1.25 mg/dL    Calcium 8.2 (L) 8.5 - 10.1 mg/dL    Glucose 103 (H) 70 - 99 mg/dL    Alkaline Phosphatase 58 40 - 150 U/L    AST 32 0 - 45 U/L    ALT 38 0 - 70 U/L    Protein Total 7.3 6.8 - 8.8 g/dL    Albumin 3.6 3.4 - 5.0 g/dL    Bilirubin Total 0.5 0.2 - 1.3 mg/dL    GFR Estimate 79 >60 mL/min/1.73m2   CBC with platelets differential    Narrative    The following orders were created for panel order CBC with platelets differential.  Procedure                               Abnormality         Status                     ---------                               -----------         ------                     CBC with platelets and d...[[id number]]  Abnormal            Final result                 Please view results for these tests on the individual orders.   Symptomatic COVID-19 Virus (Coronavirus) by PCR Nasopharyngeal    Specimen: Nasopharyngeal; Swab    Narrative    The following orders were created for panel order Symptomatic COVID-19 Virus (Coronavirus) by PCR Nasopharyngeal.  Procedure                               Abnormality         Status                      ---------                               -----------         ------                     SARS-COV2 (COVID-19) Vir...[[id number]]  Abnormal            Final result                 Please view results for these tests on the individual orders.   CBC with platelets and differential   Result Value Ref Range    WBC Count 4.8 4.0 - 11.0 10e3/uL    RBC Count 5.17 4.40 - 5.90 10e6/uL    Hemoglobin 15.5 13.3 - 17.7 g/dL    Hematocrit 45.8 40.0 - 53.0 %    MCV 89 78 - 100 fL    MCH 30.0 26.5 - 33.0 pg    MCHC 33.8 31.5 - 36.5 g/dL    RDW 13.0 10.0 - 15.0 %    Platelet Count 213 150 - 450 10e3/uL    % Neutrophils 82 %    % Lymphocytes 10 %    % Monocytes 8 %    % Eosinophils 0 %    % Basophils 0 %    % Immature Granulocytes 0 %    NRBCs per 100 WBC 0 <1 /100    Absolute Neutrophils 3.9 1.6 - 8.3 10e3/uL    Absolute Lymphocytes 0.5 (L) 0.8 - 5.3 10e3/uL    Absolute Monocytes 0.4 0.0 - 1.3 10e3/uL    Absolute Eosinophils 0.0 0.0 - 0.7 10e3/uL    Absolute Basophils 0.0 0.0 - 0.2 10e3/uL    Absolute Immature Granulocytes 0.0 <=0.0 10e3/uL    Absolute NRBCs 0.0 10e3/uL   SARS-COV2 (COVID-19) Virus RT-PCR    Specimen: Nasopharyngeal; Swab   Result Value Ref Range    SARS CoV2 PCR Positive (A) Negative    Narrative    Testing was performed using the devora  SARS-CoV-2 & Influenza A/B Assay on the devora  Tova  System.  This test should be ordered for the detection of SARS-COV-2 in individuals who meet SARS-CoV-2 clinical and/or epidemiological criteria. Test performance is unknown in asymptomatic patients.  This test is for in vitro diagnostic use under the FDA EUA for laboratories certified under CLIA to perform moderate and/or high complexity testing. This test has not been FDA cleared or approved.  A negative test does not rule out the presence of PCR inhibitors in the specimen or target RNA in concentration below the limit of detection for the assay. The possibility of a false negative should be considered if the patient's  recent exposure or clinical presentation suggests COVID-19.  [facility] are certified under the Clinical Laboratory Improvement Amendments of 1988 (CLIA-88) as qualified to perform moderate and/or high complexity laboratory testing.   XR Chest Port 1 View    Narrative    CHEST PORTABLE ONE VIEW  July 14, 2021 11:37 AM     HISTORY: Cough, chills.    COMPARISON: Chest x-rays dated 12/25/2012.    FINDINGS: The lungs are clear. No pleural effusions or pneumothorax.  Heart size and pulmonary vascularity are within normal limits. No  acute fracture.       Impression    IMPRESSION: No evidence of acute cardiopulmonary disease is seen.  Specifically no evidence for pneumonia is identified.       Medications   ondansetron (ZOFRAN) injection 4 mg (4 mg Intravenous Given 7/14/21 1123)   lactated ringers BOLUS 1,000 mL (0 mLs Intravenous Stopped 7/14/21 1242)     Followed by   lactated ringers BOLUS 1,000 mL (1,000 mLs Intravenous New Bag 7/14/21 1242)   ketorolac (TORADOL) injection 30 mg (30 mg Intravenous Given 7/14/21 1120)       Assessments & Plan     I have reviewed the nursing notes.    I have reviewed the findings, diagnosis, plan and need for follow up with the patient.       New Prescriptions    ONDANSETRON (ZOFRAN ODT) 4 MG ODT TAB    Take 1 tablet (4 mg) by mouth every 6 hours as needed for nausea       Final diagnoses:   2019 novel coronavirus disease (COVID-19)   Nausea   Diarrhea, unspecified type   Dehydration       7/14/2021   [facility] EMERGENCY DEPT     Luke Agrawal MD  07/14/21 0696

## 2021-07-14 NOTE — DISCHARGE INSTRUCTIONS
Use the Zofran ODT as needed for nausea.  Encourage fluids.  Advance diet as tolerated.  It was nice visiting with you today.  I glad you are feeling better and hope you continue to improve.    Thank you for choosing [facility]. We appreciate the opportunity to meet your urgent medical needs. Please let us know if we could have done anything to make your stay more satisfying.    After discharge, please closely monitor for any new or worsening symptoms. Return to the Emergency Department if you develop any acute worsening signs or symptoms.    If you had lab work, cultures or imaging studies done during your stay, the final results may still be pending. We will call you if your plan of care needs to change. However, if you are not improving as expected, please follow up with your primary care provider or clinic.     Start any prescription medications that were prescribed to you and take them as directed.     Please see additional handouts that may be pertinent to your condition.    Sign Up for GetWell Loop  COVID-19 Symptoms  We know it's scary to hear you might have COVID-19. We want to track your symptoms to make sure you're OK over the next 2 weeks.  Please look for an email from [employer]. This is a free, online program that we'll use to keep in touch. To sign up, click the link in the email you get.  If you don't get an email, please call your [facility] clinic. Ask them to sign you up for GetWell Loop.  You can learn more at [email or web address].  For informational purposes only. Not to replace the advice of your health care provider.   Copyright   2020 [facility]. Clinically reviewed by Alena Stephens. All rights reserved. [employer] 200197 - 04/20.            COVID-19  Resources  [facility]: About COVID-19: [email or web address]  CDC: What to Do If You're Sick: www.cdc.gov/coronavirus/2019-ncov/about/steps-when-sick.html  CDC: Ending  Home Isolation: www.cdc.gov/coronavirus/2019-ncov/hcp/disposition-in-home-patients.html   CDC: Caring for Someone: www.cdc.gov/coronavirus/2019-ncov/if-you-are-sick/care-for-someone.html   [facility]: Interim Guidance for Hospital Discharge to Home: www.[facility] access hospital.[place]./diseases/coronavirus/hcp/hospdischarge.pdf  [facility] clinical trials (COVID-19 research studies): clinicalaffairs.[facility]-clinical-trials   Below are the COVID-19 hotlines at the [place] Department of Health ([facility]). Interpreters are available.   For health questions: Call 738-239-6281 or 1-701.520.1318 (7 a.m. to 7 p.m.)  For questions about schools and childcare: Call 241-905-2983 or 1-347.851.3792 (7 a.m. to 7 p.m.)

## 2021-07-17 ENCOUNTER — HOSPITAL ENCOUNTER (EMERGENCY)
Facility: CLINIC | Age: 51
Discharge: HOME | End: 2021-07-17
Attending: EMERGENCY MEDICINE | Admitting: EMERGENCY MEDICINE
Payer: COMMERCIAL

## 2021-07-17 VITALS
TEMPERATURE: 98.6 F | HEART RATE: 79 BPM | BODY MASS INDEX: 24.84 KG/M2 | WEIGHT: 165.8 LBS | OXYGEN SATURATION: 97 % | SYSTOLIC BLOOD PRESSURE: 115 MMHG | DIASTOLIC BLOOD PRESSURE: 79 MMHG | RESPIRATION RATE: 16 BRPM

## 2021-07-17 DIAGNOSIS — E86.0 DEHYDRATION: ICD-10-CM

## 2021-07-17 DIAGNOSIS — U07.1 INFECTION DUE TO 2019 NOVEL CORONAVIRUS: ICD-10-CM

## 2021-07-17 LAB
ALBUMIN SERPL-MCNC: 3.2 G/DL (ref 3.4–5)
ALP SERPL-CCNC: 51 U/L (ref 40–150)
ALT SERPL W P-5'-P-CCNC: 51 U/L (ref 0–70)
ANION GAP SERPL CALCULATED.3IONS-SCNC: 5 MMOL/L (ref 3–14)
AST SERPL W P-5'-P-CCNC: 68 U/L (ref 0–45)
BASOPHILS # BLD AUTO: 0 10E3/UL (ref 0–0.2)
BASOPHILS NFR BLD AUTO: 0 %
BILIRUB SERPL-MCNC: 0.5 MG/DL (ref 0.2–1.3)
BUN SERPL-MCNC: 12 MG/DL (ref 7–30)
CALCIUM SERPL-MCNC: 8.6 MG/DL (ref 8.5–10.1)
CHLORIDE BLD-SCNC: 103 MMOL/L (ref 94–109)
CO2 SERPL-SCNC: 29 MMOL/L (ref 20–32)
CREAT SERPL-MCNC: 1.05 MG/DL (ref 0.66–1.25)
EOSINOPHIL # BLD AUTO: 0 10E3/UL (ref 0–0.7)
EOSINOPHIL NFR BLD AUTO: 0 %
ERYTHROCYTE [DISTWIDTH] IN BLOOD BY AUTOMATED COUNT: 13 % (ref 10–15)
GFR SERPL CREATININE-BSD FRML MDRD: 82 ML/MIN/1.73M2
GLUCOSE BLD-MCNC: 113 MG/DL (ref 70–99)
HCT VFR BLD AUTO: 45.9 % (ref 40–53)
HGB BLD-MCNC: 15.4 G/DL (ref 13.3–17.7)
IMM GRANULOCYTES # BLD: 0 10E3/UL
IMM GRANULOCYTES NFR BLD: 1 %
LYMPHOCYTES # BLD AUTO: 0.5 10E3/UL (ref 0.8–5.3)
LYMPHOCYTES NFR BLD AUTO: 7 %
MCH RBC QN AUTO: 29.5 PG (ref 26.5–33)
MCHC RBC AUTO-ENTMCNC: 33.6 G/DL (ref 31.5–36.5)
MCV RBC AUTO: 88 FL (ref 78–100)
MONOCYTES # BLD AUTO: 0.4 10E3/UL (ref 0–1.3)
MONOCYTES NFR BLD AUTO: 6 %
NEUTROPHILS # BLD AUTO: 6 10E3/UL (ref 1.6–8.3)
NEUTROPHILS NFR BLD AUTO: 86 %
NRBC # BLD AUTO: 0 10E3/UL
NRBC BLD AUTO-RTO: 0 /100
PLATELET # BLD AUTO: 297 10E3/UL (ref 150–450)
POTASSIUM BLD-SCNC: 4.3 MMOL/L (ref 3.4–5.3)
PROT SERPL-MCNC: 6.8 G/DL (ref 6.8–8.8)
RBC # BLD AUTO: 5.22 10E6/UL (ref 4.4–5.9)
SODIUM SERPL-SCNC: 137 MMOL/L (ref 133–144)
WBC # BLD AUTO: 7 10E3/UL (ref 4–11)

## 2021-07-17 PROCEDURE — 36415 COLL VENOUS BLD VENIPUNCTURE: CPT | Performed by: EMERGENCY MEDICINE

## 2021-07-17 PROCEDURE — 99284 EMERGENCY DEPT VISIT MOD MDM: CPT | Performed by: EMERGENCY MEDICINE

## 2021-07-17 PROCEDURE — 96375 TX/PRO/DX INJ NEW DRUG ADDON: CPT | Performed by: EMERGENCY MEDICINE

## 2021-07-17 PROCEDURE — 96361 HYDRATE IV INFUSION ADD-ON: CPT | Performed by: EMERGENCY MEDICINE

## 2021-07-17 PROCEDURE — 85025 COMPLETE CBC W/AUTO DIFF WBC: CPT | Performed by: EMERGENCY MEDICINE

## 2021-07-17 PROCEDURE — 96374 THER/PROPH/DIAG INJ IV PUSH: CPT | Performed by: EMERGENCY MEDICINE

## 2021-07-17 PROCEDURE — 250N000011 HC RX IP 250 OP 636: Performed by: EMERGENCY MEDICINE

## 2021-07-17 PROCEDURE — 82040 ASSAY OF SERUM ALBUMIN: CPT | Performed by: EMERGENCY MEDICINE

## 2021-07-17 PROCEDURE — 36592 COLLECT BLOOD FROM PICC: CPT | Performed by: EMERGENCY MEDICINE

## 2021-07-17 PROCEDURE — 258N000003 HC RX IP 258 OP 636: Performed by: EMERGENCY MEDICINE

## 2021-07-17 PROCEDURE — 99284 EMERGENCY DEPT VISIT MOD MDM: CPT | Mod: 25 | Performed by: EMERGENCY MEDICINE

## 2021-07-17 RX ORDER — KETOROLAC TROMETHAMINE 15 MG/ML
15 INJECTION, SOLUTION INTRAMUSCULAR; INTRAVENOUS ONCE
Status: COMPLETED | OUTPATIENT
Start: 2021-07-17 | End: 2021-07-17

## 2021-07-17 RX ORDER — PROCHLORPERAZINE MALEATE 10 MG
10 TABLET ORAL EVERY 6 HOURS PRN
Qty: 12 TABLET | Refills: 0 | Status: SHIPPED | OUTPATIENT
Start: 2021-07-17 | End: 2022-09-27

## 2021-07-17 RX ORDER — ONDANSETRON 2 MG/ML
4 INJECTION INTRAMUSCULAR; INTRAVENOUS EVERY 30 MIN PRN
Status: DISCONTINUED | OUTPATIENT
Start: 2021-07-17 | End: 2021-07-17

## 2021-07-17 RX ORDER — SODIUM CHLORIDE 9 MG/ML
INJECTION, SOLUTION INTRAVENOUS CONTINUOUS
Status: DISCONTINUED | OUTPATIENT
Start: 2021-07-17 | End: 2021-07-17 | Stop reason: HOSPADM

## 2021-07-17 RX ADMIN — PROCHLORPERAZINE EDISYLATE 10 MG: 5 INJECTION INTRAMUSCULAR; INTRAVENOUS at 11:31

## 2021-07-17 RX ADMIN — SODIUM CHLORIDE 1000 ML: 9 INJECTION, SOLUTION INTRAVENOUS at 11:29

## 2021-07-17 RX ADMIN — SODIUM CHLORIDE 500 ML: 9 INJECTION, SOLUTION INTRAVENOUS at 12:10

## 2021-07-17 RX ADMIN — SODIUM CHLORIDE 500 ML: 9 INJECTION, SOLUTION INTRAVENOUS at 12:05

## 2021-07-17 RX ADMIN — KETOROLAC TROMETHAMINE 15 MG: 15 INJECTION, SOLUTION INTRAMUSCULAR; INTRAVENOUS at 11:30

## 2021-07-17 ASSESSMENT — ENCOUNTER SYMPTOMS
APPETITE CHANGE: 1
FEVER: 1
ACTIVITY CHANGE: 1
FATIGUE: 1
NAUSEA: 1
VOMITING: 1
SHORTNESS OF BREATH: 0
COUGH: 1
DIARRHEA: 1
ABDOMINAL PAIN: 0

## 2021-07-17 NOTE — ED PROVIDER NOTES
"  History     Chief Complaint   Patient presents with     Fatigue     Nausea & Vomiting     HPI  Pelon Flores is a 51 year old male significant past medical history for irritable bowel syndrome, GERD, chronic dyspepsia.  Last Friday started having a typical Covid symptoms.  Other members of the family did as well.  He had a confirmation positive test this past Wednesday.  He presents by private vehicle with complaint of fatigue, persistent nausea vomiting and diarrhea.  He is dehydrated.  He feels lightheaded when he stands.  If his vision goes \"white\" when he stands but is never passed out.  He is having no chest discomfort.  Denies any altered taste or smell.  Has had no shortness of breath.  Only infrequent cough.    Allergies:  Allergies   Allergen Reactions     Nkda [No Known Drug Allergies]        Problem List:    Patient Active Problem List    Diagnosis Date Noted     Phobia, flying 03/23/2018     Priority: Medium     Chronic maxillary sinusitis 02/21/2013     Priority: Medium     GERD (gastroesophageal reflux disease) 10/24/2011     Priority: Medium     Irritable bowel syndrome with diarrhea 01/28/2010     Priority: Medium     Family history of diabetes mellitus 01/28/2010     Priority: Medium     ISOLATED OR SPECIFIC PHOBIAS NEC 02/06/2007     Priority: Medium     Dyspepsia and other specified disorders of function of stomach 12/08/2006     Priority: Medium        Past Medical History:    Past Medical History:   Diagnosis Date     DEPRESSIVE PSYCHOSIS-MOD 12/2/2007     Inguinal hernia without mention of obstruction or gangrene, bilateral, (not specified as recurrent)      ISOLATED OR SPECIFIC PHOBIAS NEC 2/6/2007     Palpitations 1982       Past Surgical History:    Past Surgical History:   Procedure Laterality Date     HERNIA REPAIR, INGUINAL RT/LT         Family History:    Family History   Problem Relation Age of Onset     Depression Mother      Lipids Mother         on med     Depression Brother  "        on med for treatment     Diabetes Father         type 2 age 50     Lipids Father         on med     Gastrointestinal Disease No family hx of      Heart Disease No family hx of         no arrhythmias     C.A.D. No family hx of      Cancer - colorectal No family hx of      Prostate Cancer No family hx of        Social History:  Marital Status:   [2]  Social History     Tobacco Use     Smoking status: Never Smoker     Smokeless tobacco: Never Used     Tobacco comment: no smokers in the household   Substance Use Topics     Alcohol use: Yes     Comment: Rare     Drug use: No        Medications:    ondansetron (ZOFRAN ODT) 4 MG ODT tab  diazepam (VALIUM) 10 MG tablet  guaiFENesin-dextromethorphan (ROBITUSSIN DM) 100-10 MG/5ML syrup  Pseudoephedrine-Ibuprofen (ADVIL COLD/SINUS)  MG TABS          Review of Systems   Constitutional: Positive for activity change, appetite change, fatigue and fever.   Respiratory: Positive for cough. Negative for shortness of breath.    Gastrointestinal: Positive for diarrhea, nausea and vomiting. Negative for abdominal pain.   All other systems reviewed and are negative.      Physical Exam   BP: 93/71  Pulse: 87  Temp: 98.6  F (37  C)  Resp: 16  Weight: 75.2 kg (165 lb 12.8 oz)  SpO2: 96 %      Physical Exam  Vitals and nursing note reviewed.   Constitutional:       Appearance: He is not ill-appearing.   HENT:      Head: Normocephalic and atraumatic.      Right Ear: External ear normal.      Left Ear: External ear normal.      Nose: Nose normal.      Mouth/Throat:      Mouth: Mucous membranes are dry.   Eyes:      General: Lids are normal. No scleral icterus.     Conjunctiva/sclera: Conjunctivae normal.      Pupils: Pupils are equal, round, and reactive to light.      Funduscopic exam:     Right eye: No hemorrhage.         Left eye: No hemorrhage.   Neck:      Vascular: No carotid bruit or JVD.      Trachea: Trachea normal.   Cardiovascular:      Rate and Rhythm: Normal  rate and regular rhythm.  No extrasystoles are present.     Heart sounds: S1 normal and S2 normal. No murmur heard.     Pulmonary:      Effort: Pulmonary effort is normal. No respiratory distress.      Breath sounds: Normal breath sounds. No wheezing, rhonchi or rales.   Abdominal:      General: Bowel sounds are normal. There is no distension.      Palpations: Abdomen is soft. There is no hepatomegaly or splenomegaly.      Tenderness: There is no abdominal tenderness. There is no rebound.      Hernia: No hernia is present.   Musculoskeletal:         General: Normal range of motion.      Cervical back: Neck supple.   Lymphadenopathy:      Cervical: No cervical adenopathy.   Skin:     General: Skin is warm and dry.      Coloration: Skin is not pale.      Findings: No rash.   Neurological:      Mental Status: He is alert and oriented to person, place, and time.      Sensory: No sensory deficit.      Deep Tendon Reflexes: Reflexes are normal and symmetric.   Psychiatric:         Speech: Speech normal.         Behavior: Behavior normal.         ED Course        Procedures                  Results for orders placed or performed during the hospital encounter of 07/17/21 (from the past 24 hour(s))   CBC with platelets differential    Narrative    The following orders were created for panel order CBC with platelets differential.  Procedure                               Abnormality         Status                     ---------                               -----------         ------                     CBC with platelets and d...[[id number]]  Abnormal            Final result                 Please view results for these tests on the individual orders.   Comprehensive metabolic panel   Result Value Ref Range    Sodium 137 133 - 144 mmol/L    Potassium 4.3 3.4 - 5.3 mmol/L    Chloride 103 94 - 109 mmol/L    Carbon Dioxide (CO2) 29 20 - 32 mmol/L    Anion Gap 5 3 - 14 mmol/L    Urea Nitrogen 12 7 - 30 mg/dL    Creatinine 1.05 0.66  - 1.25 mg/dL    Calcium 8.6 8.5 - 10.1 mg/dL    Glucose 113 (H) 70 - 99 mg/dL    Alkaline Phosphatase 51 40 - 150 U/L    AST 68 (H) 0 - 45 U/L    ALT 51 0 - 70 U/L    Protein Total 6.8 6.8 - 8.8 g/dL    Albumin 3.2 (L) 3.4 - 5.0 g/dL    Bilirubin Total 0.5 0.2 - 1.3 mg/dL    GFR Estimate 82 >60 mL/min/1.73m2   CBC with platelets and differential   Result Value Ref Range    WBC Count 7.0 4.0 - 11.0 10e3/uL    RBC Count 5.22 4.40 - 5.90 10e6/uL    Hemoglobin 15.4 13.3 - 17.7 g/dL    Hematocrit 45.9 40.0 - 53.0 %    MCV 88 78 - 100 fL    MCH 29.5 26.5 - 33.0 pg    MCHC 33.6 31.5 - 36.5 g/dL    RDW 13.0 10.0 - 15.0 %    Platelet Count 297 150 - 450 10e3/uL    % Neutrophils 86 %    % Lymphocytes 7 %    % Monocytes 6 %    % Eosinophils 0 %    % Basophils 0 %    % Immature Granulocytes 1 %    NRBCs per 100 WBC 0 <1 /100    Absolute Neutrophils 6.0 1.6 - 8.3 10e3/uL    Absolute Lymphocytes 0.5 (L) 0.8 - 5.3 10e3/uL    Absolute Monocytes 0.4 0.0 - 1.3 10e3/uL    Absolute Eosinophils 0.0 0.0 - 0.7 10e3/uL    Absolute Basophils 0.0 0.0 - 0.2 10e3/uL    Absolute Immature Granulocytes 0.0 <=0.0 10e3/uL    Absolute NRBCs 0.0 10e3/uL       Medications   0.9% sodium chloride BOLUS (has no administration in time range)     Followed by   sodium chloride 0.9% infusion (has no administration in time range)   ketorolac (TORADOL) injection 15 mg (has no administration in time range)   prochlorperazine (COMPAZINE) injection 10 mg (has no administration in time range)       Assessments & Plan (with Medical Decision Making)  Pelon is 51 years of age.  Presents with known Covid infection.  He was confirmed positive this week.  His primary concern is digestive with nausea limiting fluid intake and ongoing diarrhea.  He is concerned about dehydration.  No respiratory concerns.  Patient has been having orthostatic symptoms prior to ED arrival.  Patient arrived with a BP of 93/73, pulse 87, temp 98.6, respirations of 16 with O2 sats of 96% on  room air.  75 kg.  He did appear dry fluid volume down and had orthostatic symptoms.  Did not formally check orthostatic blood pressure and pulse.  He had dry mucous membranes.  His lungs were clear to auscultation.  Abdomen is nonsurgical nontender.  Focus was on rehydration.  Received 1500 cc of normal saline which represents 20 cc/kg bolus.  Felt much better.  His CBC and comprehensive metabolic panel were normal.  Plan at this time is to discharge home with a prescription for Compazine.  Refer to discharge instructions.         I have reviewed the nursing notes.    I have reviewed the findings, diagnosis, plan and need for follow up with the patient.      New Prescriptions    No medications on file       Final diagnoses:   Infection due to 2019 novel coronavirus   Dehydration       7/17/2021   [facility] EMERGENCY DEPT     Ney Loco,   07/17/21 [id number]

## 2021-07-17 NOTE — DISCHARGE INSTRUCTIONS
Continue with isolation for additional week to avoid spreading to other community members  Focus on nutrition and hydration  Recommend Compazine 10 mg every 6-8 hours on a scheduled basis for the next 2 days to control nausea which has been limiting food and fluid intake.  You then can switch over to just use as needed.    Fortunately at this time you're showing no respiratory concerns secondary to the Covid virus.  He started having respiratory difficulties, shortness of breath, sharp stabbing chest pain please return to the emergency department.

## 2021-07-17 NOTE — ED TRIAGE NOTES
Pt was diagnosed with covid on Wednesday, reports he continues to have diarrhea, feels nauseated and weak and thinks he is dehydrated

## 2021-09-19 ENCOUNTER — HEALTH MAINTENANCE LETTER (OUTPATIENT)
Age: 51
End: 2021-09-19

## 2022-04-18 DIAGNOSIS — F40.243 PHOBIA, FLYING: ICD-10-CM

## 2022-04-18 NOTE — LETTER
April 21, 2022      Pelon Flores  [address] [facility] [id number]              Riya Bird,     We have received your refill request, however you have not had an office visit in over a year.      We will need to have you schedule an appointment to establish care.     Please schedule via [employer] or by giving the clinic a call at [phone] we an assist you with scheduling.      If you have any questions or concerns please let us know.      Thank you,     [facility] Care Team  [phone]

## 2022-04-19 NOTE — TELEPHONE ENCOUNTER
Pending Prescriptions:                       Disp   Refills    diazepam (VALIUM) 10 MG tablet             20 tab*0        Sig: Take 1 tablet by mouth daily  Prior to flight        Routing refill request to provider for review/approval because:  D[place] [facility] [place]     Patient use to see Suyapa Martinez who is no longer available to refill the medication. Patient has not been seen for a year. Scheduling please call the patient and assist with scheduling a follow up. Request sent to provider for review in the meantime.     Bharti Rubin, MORGANN, RN, PHN  [facility] RN for [place]  April 19, 2022

## 2022-04-20 ENCOUNTER — MYC MEDICAL ADVICE (OUTPATIENT)
Dept: FAMILY MEDICINE | Facility: CLINIC | Age: 52
End: 2022-04-20
Payer: COMMERCIAL

## 2022-04-20 RX ORDER — DIAZEPAM 10 MG
TABLET ORAL
Qty: 20 TABLET | Refills: 0 | OUTPATIENT
Start: 2022-04-20

## 2022-04-20 NOTE — TELEPHONE ENCOUNTER
Reviewed PDMP. No recent refill of this medication. Patient has not been in for over a year.  Needs to have a visit and establish care.

## 2022-04-20 NOTE — TELEPHONE ENCOUNTER
Attempted to reach the patient with the following information.  Left message for patient to return call to clinic.     Lily WHITE, [facility]       Needs to make appointment to establish care.

## 2022-04-21 RX ORDER — DIAZEPAM 10 MG
TABLET ORAL
Qty: 6 TABLET | Refills: 0 | Status: SHIPPED | OUTPATIENT
Start: 2022-04-21 | End: 2022-09-27

## 2022-04-21 NOTE — TELEPHONE ENCOUNTER
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     Is hoping for a refill and then he can establish care when he comes back.

## 2022-05-01 ENCOUNTER — HEALTH MAINTENANCE LETTER (OUTPATIENT)
Age: 52
End: 2022-05-01

## 2022-09-27 ENCOUNTER — OFFICE VISIT (OUTPATIENT)
Dept: FAMILY MEDICINE | Facility: OTHER | Age: 52
End: 2022-09-27
Payer: COMMERCIAL

## 2022-09-27 VITALS
SYSTOLIC BLOOD PRESSURE: 132 MMHG | WEIGHT: 173 LBS | TEMPERATURE: 97.2 F | DIASTOLIC BLOOD PRESSURE: 86 MMHG | BODY MASS INDEX: 25.62 KG/M2 | HEART RATE: 98 BPM | RESPIRATION RATE: 20 BRPM | HEIGHT: 69 IN | OXYGEN SATURATION: 81 %

## 2022-09-27 DIAGNOSIS — Z00.00 ROUTINE HISTORY AND PHYSICAL EXAMINATION OF ADULT: Primary | ICD-10-CM

## 2022-09-27 DIAGNOSIS — R53.81 MALAISE AND FATIGUE: ICD-10-CM

## 2022-09-27 DIAGNOSIS — Z13.220 SCREENING FOR HYPERLIPIDEMIA: ICD-10-CM

## 2022-09-27 DIAGNOSIS — E78.5 HYPERLIPIDEMIA LDL GOAL <130: ICD-10-CM

## 2022-09-27 DIAGNOSIS — Z12.5 SCREENING FOR PROSTATE CANCER: ICD-10-CM

## 2022-09-27 DIAGNOSIS — R53.83 MALAISE AND FATIGUE: ICD-10-CM

## 2022-09-27 DIAGNOSIS — Z11.59 NEED FOR HEPATITIS C SCREENING TEST: ICD-10-CM

## 2022-09-27 DIAGNOSIS — F40.243 PHOBIA, FLYING: ICD-10-CM

## 2022-09-27 DIAGNOSIS — Z12.11 SCREEN FOR COLON CANCER: ICD-10-CM

## 2022-09-27 LAB
ALBUMIN SERPL-MCNC: 3.8 G/DL (ref 3.4–5)
ALP SERPL-CCNC: 79 U/L (ref 40–150)
ALT SERPL W P-5'-P-CCNC: 34 U/L (ref 0–70)
ANION GAP SERPL CALCULATED.3IONS-SCNC: 6 MMOL/L (ref 3–14)
AST SERPL W P-5'-P-CCNC: 20 U/L (ref 0–45)
BILIRUB SERPL-MCNC: 0.9 MG/DL (ref 0.2–1.3)
BUN SERPL-MCNC: 15 MG/DL (ref 7–30)
CALCIUM SERPL-MCNC: 9 MG/DL (ref 8.5–10.1)
CHLORIDE BLD-SCNC: 106 MMOL/L (ref 94–109)
CHOLEST SERPL-MCNC: 176 MG/DL
CO2 SERPL-SCNC: 27 MMOL/L (ref 20–32)
CREAT SERPL-MCNC: 0.98 MG/DL (ref 0.66–1.25)
ERYTHROCYTE [DISTWIDTH] IN BLOOD BY AUTOMATED COUNT: 13.3 % (ref 10–15)
FASTING STATUS PATIENT QL REPORTED: YES
GFR SERPL CREATININE-BSD FRML MDRD: >90 ML/MIN/1.73M2
GLUCOSE BLD-MCNC: 102 MG/DL (ref 70–99)
HCT VFR BLD AUTO: 44.8 % (ref 40–53)
HDLC SERPL-MCNC: 38 MG/DL
HGB BLD-MCNC: 15.1 G/DL (ref 13.3–17.7)
LDLC SERPL CALC-MCNC: 112 MG/DL
MCH RBC QN AUTO: 30 PG (ref 26.5–33)
MCHC RBC AUTO-ENTMCNC: 33.7 G/DL (ref 31.5–36.5)
MCV RBC AUTO: 89 FL (ref 78–100)
NONHDLC SERPL-MCNC: 138 MG/DL
PLATELET # BLD AUTO: 357 10E3/UL (ref 150–450)
POTASSIUM BLD-SCNC: 4.3 MMOL/L (ref 3.4–5.3)
PROT SERPL-MCNC: 7.5 G/DL (ref 6.8–8.8)
PSA SERPL-MCNC: 2.33 UG/L (ref 0–4)
RBC # BLD AUTO: 5.04 10E6/UL (ref 4.4–5.9)
SHBG SERPL-SCNC: 42 NMOL/L (ref 11–80)
SODIUM SERPL-SCNC: 139 MMOL/L (ref 133–144)
TRIGL SERPL-MCNC: 130 MG/DL
TSH SERPL DL<=0.005 MIU/L-ACNC: 0.78 MU/L (ref 0.4–4)
WBC # BLD AUTO: 6.3 10E3/UL (ref 4–11)

## 2022-09-27 PROCEDURE — 80053 COMPREHEN METABOLIC PANEL: CPT | Performed by: PHYSICIAN ASSISTANT

## 2022-09-27 PROCEDURE — 84270 ASSAY OF SEX HORMONE GLOBUL: CPT | Performed by: PHYSICIAN ASSISTANT

## 2022-09-27 PROCEDURE — 99396 PREV VISIT EST AGE 40-64: CPT | Performed by: PHYSICIAN ASSISTANT

## 2022-09-27 PROCEDURE — 84403 ASSAY OF TOTAL TESTOSTERONE: CPT | Performed by: PHYSICIAN ASSISTANT

## 2022-09-27 PROCEDURE — G0103 PSA SCREENING: HCPCS | Performed by: PHYSICIAN ASSISTANT

## 2022-09-27 PROCEDURE — 85027 COMPLETE CBC AUTOMATED: CPT | Performed by: PHYSICIAN ASSISTANT

## 2022-09-27 PROCEDURE — 84443 ASSAY THYROID STIM HORMONE: CPT | Performed by: PHYSICIAN ASSISTANT

## 2022-09-27 PROCEDURE — 36415 COLL VENOUS BLD VENIPUNCTURE: CPT | Performed by: PHYSICIAN ASSISTANT

## 2022-09-27 PROCEDURE — 80061 LIPID PANEL: CPT | Performed by: PHYSICIAN ASSISTANT

## 2022-09-27 PROCEDURE — 99213 OFFICE O/P EST LOW 20 MIN: CPT | Mod: 25 | Performed by: PHYSICIAN ASSISTANT

## 2022-09-27 RX ORDER — DIAZEPAM 10 MG
TABLET ORAL
Qty: 24 TABLET | Refills: 0 | Status: SHIPPED | OUTPATIENT
Start: 2022-09-27

## 2022-09-27 ASSESSMENT — ENCOUNTER SYMPTOMS
PALPITATIONS: 0
CONSTIPATION: 0
DYSURIA: 0
NERVOUS/ANXIOUS: 0
ARTHRALGIAS: 0
SORE THROAT: 0
FEVER: 0
CHILLS: 0
MYALGIAS: 1
PARESTHESIAS: 0
HEADACHES: 0
FREQUENCY: 0
WEAKNESS: 0
COUGH: 0
EYE PAIN: 0
SHORTNESS OF BREATH: 0
HEMATOCHEZIA: 0
NAUSEA: 0
DIARRHEA: 0
JOINT SWELLING: 0
DIZZINESS: 0
ABDOMINAL PAIN: 0
HEMATURIA: 0
HEARTBURN: 0

## 2022-09-27 ASSESSMENT — PAIN SCALES - GENERAL: PAINLEVEL: NO PAIN (0)
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
## 2022-09-29 LAB
TESTOST FREE SERPL-MCNC: 8.2 NG/DL
TESTOST SERPL-MCNC: 460 NG/DL (ref 240–950)

## 2022-12-19 NOTE — ED NOTES
At discharge pt vss stable. 02 sats stable. Tolerating food and apple juice.    Cellulitis Cellulitis Cellulitis Cellulitis Cellulitis Cellulitis

## 2023-11-25 ENCOUNTER — HEALTH MAINTENANCE LETTER (OUTPATIENT)
Age: 53
End: 2023-11-25

## 2024-10-14 SDOH — HEALTH STABILITY: PHYSICAL HEALTH: ON AVERAGE, HOW MANY MINUTES DO YOU ENGAGE IN EXERCISE AT THIS LEVEL?: 0 MIN

## 2024-10-14 SDOH — HEALTH STABILITY: PHYSICAL HEALTH: ON AVERAGE, HOW MANY DAYS PER WEEK DO YOU ENGAGE IN MODERATE TO STRENUOUS EXERCISE (LIKE A BRISK WALK)?: 0 DAYS

## 2024-10-14 ASSESSMENT — SOCIAL DETERMINANTS OF HEALTH (SDOH): HOW OFTEN DO YOU GET TOGETHER WITH FRIENDS OR RELATIVES?: MORE THAN THREE TIMES A WEEK

## 2024-10-15 ENCOUNTER — OFFICE VISIT (OUTPATIENT)
Dept: FAMILY MEDICINE | Facility: OTHER | Age: 54
End: 2024-10-15
Payer: COMMERCIAL

## 2024-10-15 VITALS
BODY MASS INDEX: 26.29 KG/M2 | WEIGHT: 177.5 LBS | OXYGEN SATURATION: 97 % | SYSTOLIC BLOOD PRESSURE: 130 MMHG | HEART RATE: 95 BPM | TEMPERATURE: 96.9 F | DIASTOLIC BLOOD PRESSURE: 78 MMHG | HEIGHT: 69 IN | RESPIRATION RATE: 18 BRPM

## 2024-10-15 DIAGNOSIS — K21.9 GASTROESOPHAGEAL REFLUX DISEASE WITHOUT ESOPHAGITIS: ICD-10-CM

## 2024-10-15 DIAGNOSIS — R53.81 MALAISE AND FATIGUE: ICD-10-CM

## 2024-10-15 DIAGNOSIS — Z00.00 ROUTINE HISTORY AND PHYSICAL EXAMINATION OF ADULT: Primary | ICD-10-CM

## 2024-10-15 DIAGNOSIS — Z12.11 SCREEN FOR COLON CANCER: ICD-10-CM

## 2024-10-15 DIAGNOSIS — Z83.3 FAMILY HISTORY OF DIABETES MELLITUS: ICD-10-CM

## 2024-10-15 DIAGNOSIS — R00.2 PALPITATIONS: ICD-10-CM

## 2024-10-15 DIAGNOSIS — F51.04 PSYCHOPHYSIOLOGIC INSOMNIA: ICD-10-CM

## 2024-10-15 DIAGNOSIS — Z12.5 SCREENING FOR PROSTATE CANCER: ICD-10-CM

## 2024-10-15 DIAGNOSIS — E78.5 HYPERLIPIDEMIA LDL GOAL <130: ICD-10-CM

## 2024-10-15 DIAGNOSIS — F40.243 PHOBIA, FLYING: ICD-10-CM

## 2024-10-15 DIAGNOSIS — Z11.59 NEED FOR HEPATITIS C SCREENING TEST: ICD-10-CM

## 2024-10-15 DIAGNOSIS — R53.83 MALAISE AND FATIGUE: ICD-10-CM

## 2024-10-15 LAB
ALBUMIN SERPL BCG-MCNC: 4.3 G/DL (ref 3.5–5.2)
ALP SERPL-CCNC: 80 U/L (ref 40–150)
ALT SERPL W P-5'-P-CCNC: 39 U/L (ref 0–70)
ANION GAP SERPL CALCULATED.3IONS-SCNC: 9 MMOL/L (ref 7–15)
AST SERPL W P-5'-P-CCNC: 22 U/L (ref 0–45)
BILIRUB SERPL-MCNC: 0.8 MG/DL
BUN SERPL-MCNC: 16.7 MG/DL (ref 6–20)
CALCIUM SERPL-MCNC: 9.5 MG/DL (ref 8.8–10.4)
CHLORIDE SERPL-SCNC: 105 MMOL/L (ref 98–107)
CHOLEST SERPL-MCNC: 217 MG/DL
CREAT SERPL-MCNC: 1.14 MG/DL (ref 0.67–1.17)
EGFRCR SERPLBLD CKD-EPI 2021: 76 ML/MIN/1.73M2
ERYTHROCYTE [DISTWIDTH] IN BLOOD BY AUTOMATED COUNT: 13 % (ref 10–15)
FASTING STATUS PATIENT QL REPORTED: YES
FASTING STATUS PATIENT QL REPORTED: YES
FOLATE SERPL-MCNC: 10.3 NG/ML (ref 4.6–34.8)
GLUCOSE SERPL-MCNC: 106 MG/DL (ref 70–99)
HCO3 SERPL-SCNC: 28 MMOL/L (ref 22–29)
HCT VFR BLD AUTO: 45.9 % (ref 40–53)
HDLC SERPL-MCNC: 40 MG/DL
HGB BLD-MCNC: 15.3 G/DL (ref 13.3–17.7)
LDLC SERPL CALC-MCNC: 145 MG/DL
MCH RBC QN AUTO: 29.8 PG (ref 26.5–33)
MCHC RBC AUTO-ENTMCNC: 33.3 G/DL (ref 31.5–36.5)
MCV RBC AUTO: 89 FL (ref 78–100)
NONHDLC SERPL-MCNC: 177 MG/DL
PLATELET # BLD AUTO: 297 10E3/UL (ref 150–450)
POTASSIUM SERPL-SCNC: 5 MMOL/L (ref 3.4–5.3)
PROT SERPL-MCNC: 6.9 G/DL (ref 6.4–8.3)
PSA SERPL DL<=0.01 NG/ML-MCNC: 2.05 NG/ML (ref 0–3.5)
RBC # BLD AUTO: 5.14 10E6/UL (ref 4.4–5.9)
SODIUM SERPL-SCNC: 142 MMOL/L (ref 135–145)
TRIGL SERPL-MCNC: 161 MG/DL
TSH SERPL DL<=0.005 MIU/L-ACNC: 1.19 UIU/ML (ref 0.3–4.2)
VIT B12 SERPL-MCNC: 480 PG/ML (ref 232–1245)
VIT D+METAB SERPL-MCNC: 27 NG/ML (ref 20–50)
WBC # BLD AUTO: 6.6 10E3/UL (ref 4–11)

## 2024-10-15 PROCEDURE — 99214 OFFICE O/P EST MOD 30 MIN: CPT | Mod: 25 | Performed by: PHYSICIAN ASSISTANT

## 2024-10-15 PROCEDURE — 84443 ASSAY THYROID STIM HORMONE: CPT | Performed by: PHYSICIAN ASSISTANT

## 2024-10-15 PROCEDURE — 80053 COMPREHEN METABOLIC PANEL: CPT | Performed by: PHYSICIAN ASSISTANT

## 2024-10-15 PROCEDURE — 80061 LIPID PANEL: CPT | Performed by: PHYSICIAN ASSISTANT

## 2024-10-15 PROCEDURE — 82607 VITAMIN B-12: CPT | Performed by: PHYSICIAN ASSISTANT

## 2024-10-15 PROCEDURE — G0103 PSA SCREENING: HCPCS | Performed by: PHYSICIAN ASSISTANT

## 2024-10-15 PROCEDURE — 82746 ASSAY OF FOLIC ACID SERUM: CPT | Performed by: PHYSICIAN ASSISTANT

## 2024-10-15 PROCEDURE — 99396 PREV VISIT EST AGE 40-64: CPT | Performed by: PHYSICIAN ASSISTANT

## 2024-10-15 PROCEDURE — 36415 COLL VENOUS BLD VENIPUNCTURE: CPT | Performed by: PHYSICIAN ASSISTANT

## 2024-10-15 PROCEDURE — 82306 VITAMIN D 25 HYDROXY: CPT | Performed by: PHYSICIAN ASSISTANT

## 2024-10-15 PROCEDURE — 85027 COMPLETE CBC AUTOMATED: CPT | Performed by: PHYSICIAN ASSISTANT

## 2024-10-15 RX ORDER — ZOLPIDEM TARTRATE 10 MG/1
10 TABLET ORAL
Qty: 21 TABLET | Refills: 0 | Status: SHIPPED | OUTPATIENT
Start: 2024-10-15

## 2024-10-15 ASSESSMENT — PAIN SCALES - GENERAL: PAINLEVEL: NO PAIN (0)

## 2024-10-15 NOTE — PROGRESS NOTES
Preventive Care Visit  [facility]  Maximus Kennedy PA-C, Family Medicine  Oct 15, 2024      Assessment & Plan     Routine history and physical examination of adult  54-year-old male here for routine physical.  Related labs pending.  Follow-up based on results- CBC with platelets; Future  - Comprehensive metabolic panel (BMP + Alb, Alk Phos, ALT, AST, Total. Bili, TP); Future  - Lipid panel reflex to direct LDL Fasting; Future  - CBC with platelets  - Comprehensive metabolic panel (BMP + Alb, Alk Phos, ALT, AST, Total. Bili, TP)  - Lipid panel reflex to direct LDL Fasting    Hyperlipidemia LDL goal <130  LDL goal established.  Labs pending.  Follow-up based on results.      Gastroesophageal reflux disease without esophagitis  No new concerns related to heartburn.    Malaise and fatigue  - TSH with free T4 reflex; Future  - Vitamin D Deficiency; Future  - Vitamin B12; Future  - Folate; Future  - TSH with free T4 reflex  - Vitamin D Deficiency  - Vitamin B12  - Folate    Family history of diabetes mellitus  Palpitations  Encouraged continuing monitoring.  He has not had an episode in quite some time.    Screen for colon cancer  - Colonoscopy Screening  Referral; Future    Need for hepatitis C screening test  Declined screening consider himself low risk.    Phobia, flying  No new needs for medications at this point in time.  Follow-up..    Screening for prostate cancer  - PSA, screen; Future  - PSA, screen    Psychophysiologic insomnia  Suspect that he is dealing with a lot more stress than what he is willing to admit.  This wakes him up in the middle of the night.  Would have him trial medications and follow-up..  - zolpidem (AMBIEN) 10 MG tablet; Take 1 tablet (10 mg) by mouth nightly as needed for sleep.    Patient has been advised of split billing requirements and indicates understanding: Yes    BMI  Estimated body mass index is 26.29 kg/m  as calculated from the following:    Height  "as of this encounter: 1.75 m (5' 8.9\").    Weight as of this encounter: 80.5 kg (177 lb 8 oz).   Weight management plan: Discussed healthy diet and exercise guidelines    Counseling  Appropriate preventive services were addressed with this patient via screening, questionnaire, or discussion as appropriate for fall prevention, nutrition, physical activity, Tobacco-use cessation, social engagement, weight loss and cognition.  Checklist reviewing preventive services available has been given to the patient.  Reviewed patient's diet, addressing concerns and/or questions.   The patient was instructed to see the dentist every 6 months.       Work on weight loss  Regular exercise    Yue Colon is a 54 year old, presenting for the following:  Physical        10/15/2024     6:52 AM   Additional Questions   Roomed by Kayla DUBOIS   Accompanied by Self        Health Care Directive  Patient does not have a Health Care Directive or Living Will: Patient states has Advance Directive and will bring in a copy to clinic.    HPI        10/14/2024   General Health   How would you rate your overall physical health? Good   Feel stress (tense, anxious, or unable to sleep) To some extent      (!) STRESS CONCERN      10/14/2024   Nutrition   Three or more servings of calcium each day? (!) NO   Diet: Regular (no restrictions)   How many servings of fruit and vegetables per day? (!) 0-1   How many sweetened beverages each day? (!) 3            10/14/2024   Exercise   Days per week of moderate/strenous exercise 0 days   Average minutes spent exercising at this level 0 min      (!) EXERCISE CONCERN      10/14/2024   Social Factors   Frequency of gathering with friends or relatives More than three times a week   Worry food won't last until get money to buy more No   Food not last or not have enough money for food? No   Do you have housing? (Housing is defined as stable permanent housing and does not include staying ouside in a car, in a " tent, in an abandoned building, in an overnight shelter, or couch-surfing.) Yes   Are you worried about losing your housing? No   Lack of transportation? No   Unable to get utilities (heat,electricity)? No            10/14/2024   Fall Risk   Fallen 2 or more times in the past year? No   Trouble with walking or balance? No             10/14/2024   Dental   Dentist two times every year? (!) NO            10/14/2024   TB Screening   Were you born outside of the [place]? No            Today's PHQ-2 Score:       10/14/2024    11:25 AM   PHQ-2 ( 1999 Pfizer)   Q1: Little interest or pleasure in doing things 0   Q2: Feeling down, depressed or hopeless 0   PHQ-2 Score 0   Q1: Little interest or pleasure in doing things Not at all   Q2: Feeling down, depressed or hopeless Not at all   PHQ-2 Score 0           10/14/2024   Substance Use   Alcohol more than 3/day or more than 7/wk No   Do you use any other substances recreationally? No        Social History     Tobacco Use    Smoking status: Never    Smokeless tobacco: Never    Tobacco comments:     no smokers in the household   Vaping Use    Vaping status: Never Used   Substance Use Topics    Alcohol use: Yes     Comment: Rare    Drug use: No           10/14/2024   STI Screening   New sexual partner(s) since last STI/HIV test? No      ASCVD Risk   The 10-year ASCVD risk score (Daren ANAYA, et al., 2019) is: 8.2%    Values used to calculate the score:      Age: 54 years      Sex: Male      Is Non- : No      Diabetic: No      Tobacco smoker: No      Systolic Blood Pressure: 160 mmHg      Is BP treated: No      HDL Cholesterol: 38 mg/dL      Total Cholesterol: 176 mg/dL       Reviewed and updated as needed this visit by Provider                    Past Medical History:   Diagnosis Date    Chronic maxillary sinusitis 02/21/2013    DEPRESSIVE PSYCHOSIS-MOD 12/02/2007    Inguinal hernia without mention of obstruction or gangrene, bilateral, (not specified  as recurrent)     ISOLATED OR SPECIFIC PHOBIAS NEC 02/06/2007    Palpitations 01/01/1982    suspected recurring SVT     Past Surgical History:   Procedure Laterality Date    HERNIA REPAIR, INGUINAL RT/LT       Lab work is in process  Labs reviewed in EPIC  BP Readings from Last 3 Encounters:   10/15/24 130/78   09/27/22 132/86   07/17/21 115/79    Wt Readings from Last 3 Encounters:   10/15/24 80.5 kg (177 lb 8 oz)   09/27/22 78.5 kg (173 lb)   07/17/21 75.2 kg (165 lb 12.8 oz)                  Patient Active Problem List   Diagnosis    Dyspepsia and other specified disorders of function of stomach    Irritable bowel syndrome with diarrhea    Family history of diabetes mellitus    ISOLATED OR SPECIFIC PHOBIAS NEC    GERD (gastroesophageal reflux disease)    Phobia, flying    Hyperlipidemia LDL goal <130    Malaise and fatigue    Palpitations     Past Surgical History:   Procedure Laterality Date    HERNIA REPAIR, INGUINAL RT/LT         Social History     Tobacco Use    Smoking status: Never    Smokeless tobacco: Never    Tobacco comments:     no smokers in the household   Substance Use Topics    Alcohol use: Yes     Comment: Rare     Family History   Problem Relation Age of Onset    Depression Mother     Lipids Mother         on med    Depression Brother         on med for treatment    Diabetes Father         type 2 age 50    Lipids Father         on med    Gastrointestinal Disease No family hx of     Heart Disease No family hx of         no arrhythmias    C.A.D. No family hx of     Cancer - colorectal No family hx of     Prostate Cancer No family hx of          Current Outpatient Medications   Medication Sig Dispense Refill    zolpidem (AMBIEN) 10 MG tablet Take 1 tablet (10 mg) by mouth nightly as needed for sleep. 21 tablet 0    diazepam (VALIUM) 10 MG tablet Take 1 tablet by mouth daily  Prior to flight (Patient not taking: Reported on 10/15/2024) 24 tablet 0     Allergies   Allergen Reactions    Nkda [No Known  "Drug Allergy]      Recent Labs   Lab Test 09/27/22  0844 07/17/21  1128 07/14/21  1117   *  --   --    HDL 38*  --   --    TRIG 130  --   --    ALT 34 51 38   CR 0.98 1.05 1.08   GFRESTIMATED >90 82 79   POTASSIUM 4.3 4.3 4.3   TSH 0.78  --   --           Review of Systems  Constitutional, HEENT, cardiovascular, pulmonary, GI, , musculoskeletal, neuro, skin, endocrine and psych systems are negative, except as otherwise noted.     Objective    Exam  BP (!) 160/90   Pulse 95   Temp 96.9  F (36.1  C) (Temporal)   Resp 18   Ht 1.75 m (5' 8.9\")   Wt 80.5 kg (177 lb 8 oz)   SpO2 97%   BMI 26.29 kg/m     Estimated body mass index is 26.29 kg/m  as calculated from the following:    Height as of this encounter: 1.75 m (5' 8.9\").    Weight as of this encounter: 80.5 kg (177 lb 8 oz).    Physical Exam  GENERAL: alert and no distress  EYES: Eyes grossly normal to inspection, PERRL and conjunctivae and sclerae normal  HENT: ear canals and TM's normal, nose and mouth without ulcers or lesions  NECK: no adenopathy, no asymmetry, masses, or scars  RESP: lungs clear to auscultation - no rales, rhonchi or wheezes  CV: regular rate and rhythm, normal S1 S2, no S3 or S4, no murmur, click or rub, no peripheral edema  ABDOMEN: soft, nontender, no hepatosplenomegaly, no masses and bowel sounds normal  MS: no gross musculoskeletal defects noted, no edema  SKIN: no suspicious lesions or rashes  NEURO: Normal strength and tone, mentation intact and speech normal  PSYCH: mentation appears normal, affect normal/bright        Signed Electronically by: Maximus Kennedy PA-C    "